# Patient Record
Sex: FEMALE | Race: BLACK OR AFRICAN AMERICAN | Employment: FULL TIME | URBAN - METROPOLITAN AREA
[De-identification: names, ages, dates, MRNs, and addresses within clinical notes are randomized per-mention and may not be internally consistent; named-entity substitution may affect disease eponyms.]

---

## 2017-06-25 ENCOUNTER — APPOINTMENT (OUTPATIENT)
Dept: CT IMAGING | Age: 47
End: 2017-06-25
Attending: NURSE PRACTITIONER
Payer: COMMERCIAL

## 2017-06-25 ENCOUNTER — HOSPITAL ENCOUNTER (EMERGENCY)
Age: 47
Discharge: HOME | End: 2017-06-25
Attending: EMERGENCY MEDICINE | Admitting: EMERGENCY MEDICINE
Payer: COMMERCIAL

## 2017-06-25 VITALS
OXYGEN SATURATION: 100 % | WEIGHT: 137 LBS | RESPIRATION RATE: 16 BRPM | SYSTOLIC BLOOD PRESSURE: 160 MMHG | BODY MASS INDEX: 20.76 KG/M2 | HEIGHT: 68 IN | DIASTOLIC BLOOD PRESSURE: 78 MMHG | TEMPERATURE: 98.3 F | HEART RATE: 74 BPM

## 2017-06-25 DIAGNOSIS — R51.9 NONINTRACTABLE HEADACHE, UNSPECIFIED CHRONICITY PATTERN, UNSPECIFIED HEADACHE TYPE: ICD-10-CM

## 2017-06-25 DIAGNOSIS — Q61.3 POLYCYSTIC KIDNEY DISEASE: ICD-10-CM

## 2017-06-25 DIAGNOSIS — R10.84 ABDOMINAL PAIN, GENERALIZED: Primary | ICD-10-CM

## 2017-06-25 LAB
ALBUMIN SERPL BCP-MCNC: 3.8 G/DL (ref 3.5–5)
ALBUMIN/GLOB SERPL: 1 {RATIO} (ref 1.1–2.2)
ALP SERPL-CCNC: 55 U/L (ref 45–117)
ALT SERPL-CCNC: 23 U/L (ref 12–78)
ANION GAP BLD CALC-SCNC: 8 MMOL/L (ref 5–15)
APPEARANCE UR: CLEAR
AST SERPL W P-5'-P-CCNC: 12 U/L (ref 15–37)
BACTERIA URNS QL MICRO: NEGATIVE /HPF
BASOPHILS # BLD AUTO: 0 K/UL (ref 0–0.1)
BASOPHILS # BLD: 1 % (ref 0–1)
BILIRUB SERPL-MCNC: 0.5 MG/DL (ref 0.2–1)
BILIRUB UR QL: NEGATIVE
BUN SERPL-MCNC: 6 MG/DL (ref 6–20)
BUN/CREAT SERPL: 6 (ref 12–20)
CALCIUM SERPL-MCNC: 8.8 MG/DL (ref 8.5–10.1)
CHLORIDE SERPL-SCNC: 104 MMOL/L (ref 97–108)
CO2 SERPL-SCNC: 27 MMOL/L (ref 21–32)
COLOR UR: ABNORMAL
CREAT SERPL-MCNC: 1 MG/DL (ref 0.55–1.02)
EOSINOPHIL # BLD: 0.1 K/UL (ref 0–0.4)
EOSINOPHIL NFR BLD: 1 % (ref 0–7)
EPITH CASTS URNS QL MICRO: ABNORMAL /LPF
ERYTHROCYTE [DISTWIDTH] IN BLOOD BY AUTOMATED COUNT: 13.7 % (ref 11.5–14.5)
GLOBULIN SER CALC-MCNC: 3.9 G/DL (ref 2–4)
GLUCOSE SERPL-MCNC: 83 MG/DL (ref 65–100)
GLUCOSE UR STRIP.AUTO-MCNC: NEGATIVE MG/DL
HCG UR QL: NEGATIVE
HCT VFR BLD AUTO: 37.2 % (ref 35–47)
HGB BLD-MCNC: 11.9 G/DL (ref 11.5–16)
HGB UR QL STRIP: ABNORMAL
KETONES UR QL STRIP.AUTO: NEGATIVE MG/DL
LEUKOCYTE ESTERASE UR QL STRIP.AUTO: ABNORMAL
LIPASE SERPL-CCNC: 140 U/L (ref 73–393)
LYMPHOCYTES # BLD AUTO: 44 % (ref 12–49)
LYMPHOCYTES # BLD: 2.7 K/UL (ref 0.8–3.5)
MCH RBC QN AUTO: 25.5 PG (ref 26–34)
MCHC RBC AUTO-ENTMCNC: 32 G/DL (ref 30–36.5)
MCV RBC AUTO: 79.7 FL (ref 80–99)
MONOCYTES # BLD: 0.2 K/UL (ref 0–1)
MONOCYTES NFR BLD AUTO: 3 % (ref 5–13)
NEUTS SEG # BLD: 3.2 K/UL (ref 1.8–8)
NEUTS SEG NFR BLD AUTO: 51 % (ref 32–75)
NITRITE UR QL STRIP.AUTO: NEGATIVE
PH UR STRIP: 6 [PH] (ref 5–8)
PLATELET # BLD AUTO: 304 K/UL (ref 150–400)
POTASSIUM SERPL-SCNC: 3.8 MMOL/L (ref 3.5–5.1)
PROT SERPL-MCNC: 7.7 G/DL (ref 6.4–8.2)
PROT UR STRIP-MCNC: NEGATIVE MG/DL
RBC # BLD AUTO: 4.67 M/UL (ref 3.8–5.2)
RBC #/AREA URNS HPF: ABNORMAL /HPF (ref 0–5)
SODIUM SERPL-SCNC: 139 MMOL/L (ref 136–145)
SP GR UR REFRACTOMETRY: <1.005 (ref 1–1.03)
UROBILINOGEN UR QL STRIP.AUTO: 0.2 EU/DL (ref 0.2–1)
WBC # BLD AUTO: 6.2 K/UL (ref 3.6–11)
WBC URNS QL MICRO: ABNORMAL /HPF (ref 0–4)

## 2017-06-25 PROCEDURE — 74177 CT ABD & PELVIS W/CONTRAST: CPT

## 2017-06-25 PROCEDURE — 74011636320 HC RX REV CODE- 636/320: Performed by: EMERGENCY MEDICINE

## 2017-06-25 PROCEDURE — 36415 COLL VENOUS BLD VENIPUNCTURE: CPT | Performed by: NURSE PRACTITIONER

## 2017-06-25 PROCEDURE — 81025 URINE PREGNANCY TEST: CPT

## 2017-06-25 PROCEDURE — 70450 CT HEAD/BRAIN W/O DYE: CPT

## 2017-06-25 PROCEDURE — 85025 COMPLETE CBC W/AUTO DIFF WBC: CPT | Performed by: NURSE PRACTITIONER

## 2017-06-25 PROCEDURE — 74011000258 HC RX REV CODE- 258: Performed by: EMERGENCY MEDICINE

## 2017-06-25 PROCEDURE — 80053 COMPREHEN METABOLIC PANEL: CPT | Performed by: NURSE PRACTITIONER

## 2017-06-25 PROCEDURE — 96360 HYDRATION IV INFUSION INIT: CPT

## 2017-06-25 PROCEDURE — 83690 ASSAY OF LIPASE: CPT | Performed by: NURSE PRACTITIONER

## 2017-06-25 PROCEDURE — 74011250636 HC RX REV CODE- 250/636: Performed by: NURSE PRACTITIONER

## 2017-06-25 PROCEDURE — 99283 EMERGENCY DEPT VISIT LOW MDM: CPT

## 2017-06-25 PROCEDURE — 81001 URINALYSIS AUTO W/SCOPE: CPT | Performed by: NURSE PRACTITIONER

## 2017-06-25 RX ORDER — SODIUM CHLORIDE 9 MG/ML
1000 INJECTION, SOLUTION INTRAVENOUS CONTINUOUS
Status: DISCONTINUED | OUTPATIENT
Start: 2017-06-25 | End: 2017-06-25 | Stop reason: HOSPADM

## 2017-06-25 RX ORDER — SODIUM CHLORIDE 0.9 % (FLUSH) 0.9 %
10 SYRINGE (ML) INJECTION
Status: COMPLETED | OUTPATIENT
Start: 2017-06-25 | End: 2017-06-25

## 2017-06-25 RX ADMIN — Medication 10 ML: at 18:26

## 2017-06-25 RX ADMIN — SODIUM CHLORIDE 100 ML: 900 INJECTION, SOLUTION INTRAVENOUS at 18:26

## 2017-06-25 RX ADMIN — IOPAMIDOL 100 ML: 755 INJECTION, SOLUTION INTRAVENOUS at 18:26

## 2017-06-25 RX ADMIN — SODIUM CHLORIDE 1000 ML: 900 INJECTION, SOLUTION INTRAVENOUS at 17:23

## 2017-06-25 NOTE — ED TRIAGE NOTES
Pt report severe abd pain that is generalized and intermittent. Pt states the pain started this past Friday. Pt states she has been nauseated but denies V/D. Pt also C/O HA since this past Friday.

## 2017-06-25 NOTE — ED PROVIDER NOTES
HPI Comments: 48 y.o. female with past medical history significant for polycystic kidney disease who presents with chief complaint of abdominal pain. Patient complains of intermittent episodes of upper abdominal \"spasms\" for 3 days. Patient states she became nauseated yesterday but denies any currently. Patient states she's had a decreased appetite and is thirsty, though she has been trying to drink water. Patient also complains of an intermittent right sided headache for the same duration, stating it is currently \"dull and pounding. \" Patient states her neck becomes stiff during the headaches. Patient states she's had worse headaches in the past but none that have lasted this long. Patient states she took some Excedrin Migraine with relief but only once. Patient states she was feeling better today, napped, then awoke with the returning pain. Patient states she had a stressful incident on 6/23/17 and denies any complaints prior to that day. Patient states she is currently on her menstrual period which she notes has been abnormally heavy. Patient does not have a nephrologist, GI, or PCP. Patient denies recent falls. Patient denies vomiting, urinary sx, or photophobia. There are no other acute medical concerns at this time. Social hx: never smoker, no alcohol  PCP: None    Note written by Igor Prater, as dictated by Asher Beavers NP 5:13 PM     The history is provided by the patient. No  was used. Past Medical History:   Diagnosis Date    Polycystic kidney disease        History reviewed. No pertinent surgical history. History reviewed. No pertinent family history. Social History     Social History    Marital status:      Spouse name: N/A    Number of children: N/A    Years of education: N/A     Occupational History    Not on file.      Social History Main Topics    Smoking status: Never Smoker    Smokeless tobacco: Never Used    Alcohol use No    Drug use: Not on file    Sexual activity: Not on file     Other Topics Concern    Not on file     Social History Narrative         ALLERGIES: Review of patient's allergies indicates no known allergies. Review of Systems   Constitutional: Positive for appetite change. Negative for chills, fatigue and fever. HENT: Negative for congestion, rhinorrhea and sore throat. Eyes: Negative for photophobia. Respiratory: Negative for cough and shortness of breath. Cardiovascular: Negative for chest pain and leg swelling. Gastrointestinal: Positive for abdominal pain. Negative for constipation, nausea and vomiting. Endocrine: Positive for polydipsia. Genitourinary: Negative for difficulty urinating and dysuria. Musculoskeletal: Positive for neck stiffness. Negative for back pain and neck pain. Skin: Negative for rash and wound. Neurological: Positive for headaches. All other systems reviewed and are negative. Vitals:    06/25/17 1650   BP: 165/83   Pulse: 74   Resp: 16   Temp: 98.3 °F (36.8 °C)   SpO2: 99%   Weight: 62.1 kg (137 lb)   Height: 5' 8\" (1.727 m)            Physical Exam   Constitutional: She is oriented to person, place, and time. She appears well-developed and well-nourished. No distress. HENT:   Head: Normocephalic and atraumatic. Right Ear: External ear normal.   Left Ear: External ear normal.   Nose: Nose normal.   Mouth/Throat: Oropharynx is clear and moist.   Head and face nontender   Eyes: Conjunctivae and EOM are normal. Pupils are equal, round, and reactive to light. Right eye exhibits no discharge. Left eye exhibits no discharge. Neck: Normal range of motion. Neck supple. Cardiovascular: Normal rate, regular rhythm, normal heart sounds and intact distal pulses. Pulmonary/Chest: Effort normal and breath sounds normal.   Abdominal: Soft. Bowel sounds are normal. She exhibits no distension. There is tenderness. There is no rebound and no guarding.    Upper abdominal tenderness, worse in the epigastrium   Musculoskeletal: Normal range of motion. She exhibits no edema or tenderness. Neurological: She is alert and oriented to person, place, and time. No cranial nerve deficit. Coordination normal.   Skin: Skin is warm and dry. No rash noted. Psychiatric: She has a normal mood and affect. Her behavior is normal. Judgment and thought content normal.   Nursing note and vitals reviewed. Note written by Igor Eaton, as dictated by Alina Troncoso NP 5:14 PM      MDM  Number of Diagnoses or Management Options  Abdominal pain, generalized:   Nonintractable headache, unspecified chronicity pattern, unspecified headache type:   Polycystic kidney disease:   Diagnosis management comments: 47 yo F with sudden onset of upper abdominal pain, headache on Friday. States sx were precipitated by stressful event.  + nausea, intermittent upper abdominal pain. Denies vomiting or diarrhea. Abdomen soft with mild tenderness upper abdomen with no rigidity, masses or guarding. No flank or CVA tenderness. Pt has polycystic kidney disease. Does not have PCP or nephrologist since moving to [facility].  + R sided neck pain that radiates up into R side of head. Denies visual sx but states this headache is different than any other headache she has ever had and has lasted longer than any other HA. Only tx since onset has been Excedrin migraine once. Labs, UA, hydration, CT of abdomen/pelvis and head ordered. Pt denies offer of medication for pain or nausea. Results reviewed. Discussed findings with pt who states she feels much improved after hydration. Discussed hx, presentation and findings with Dr. Magdaleno Clark who agrees with plan of care and plan for discharge with FU with PCP and nephrologist, provider information included in discharge instructions. Pt agreeable to plan of care, plan for discharge with return to the ED for worsening sx which were reviewed with pt prior to discharge. Amount and/or Complexity of Data Reviewed  Clinical lab tests: ordered and reviewed  Tests in the radiology section of CPT®: ordered and reviewed  Discuss the patient with other providers: yes (Dr. Stefani Rollins)    Patient Progress  Patient progress: stable    ED Course       Procedures  LABORATORY TESTS:  Recent Results (from the past 12 hour(s))   CBC WITH AUTOMATED DIFF    Collection Time: 06/25/17  5:15 PM   Result Value Ref Range    WBC 6.2 3.6 - 11.0 K/uL    RBC 4.67 3.80 - 5.20 M/uL    HGB 11.9 11.5 - 16.0 g/dL    HCT 37.2 35.0 - 47.0 %    MCV 79.7 (L) 80.0 - 99.0 FL    MCH 25.5 (L) 26.0 - 34.0 PG    MCHC 32.0 30.0 - 36.5 g/dL    RDW 13.7 11.5 - 14.5 %    PLATELET 731 689 - 270 K/uL    NEUTROPHILS 51 32 - 75 %    LYMPHOCYTES 44 12 - 49 %    MONOCYTES 3 (L) 5 - 13 %    EOSINOPHILS 1 0 - 7 %    BASOPHILS 1 0 - 1 %    ABS. NEUTROPHILS 3.2 1.8 - 8.0 K/UL    ABS. LYMPHOCYTES 2.7 0.8 - 3.5 K/UL    ABS. MONOCYTES 0.2 0.0 - 1.0 K/UL    ABS. EOSINOPHILS 0.1 0.0 - 0.4 K/UL    ABS. BASOPHILS 0.0 0.0 - 0.1 K/UL   METABOLIC PANEL, COMPREHENSIVE    Collection Time: 06/25/17  5:15 PM   Result Value Ref Range    Sodium 139 136 - 145 mmol/L    Potassium 3.8 3.5 - 5.1 mmol/L    Chloride 104 97 - 108 mmol/L    CO2 27 21 - 32 mmol/L    Anion gap 8 5 - 15 mmol/L    Glucose 83 65 - 100 mg/dL    BUN 6 6 - 20 MG/DL    Creatinine 1.00 0.55 - 1.02 MG/DL    BUN/Creatinine ratio 6 (L) 12 - 20      GFR est AA >60 >60 ml/min/1.73m2    GFR est non-AA 59 (L) >60 ml/min/1.73m2    Calcium 8.8 8.5 - 10.1 MG/DL    Bilirubin, total 0.5 0.2 - 1.0 MG/DL    ALT (SGPT) 23 12 - 78 U/L    AST (SGOT) 12 (L) 15 - 37 U/L    Alk.  phosphatase 55 45 - 117 U/L    Protein, total 7.7 6.4 - 8.2 g/dL    Albumin 3.8 3.5 - 5.0 g/dL    Globulin 3.9 2.0 - 4.0 g/dL    A-G Ratio 1.0 (L) 1.1 - 2.2     LIPASE    Collection Time: 06/25/17  5:15 PM   Result Value Ref Range    Lipase 140 73 - 393 U/L   URINALYSIS W/MICROSCOPIC    Collection Time: 06/25/17  5:19 PM   Result Value Ref Range    Color YELLOW/STRAW      Appearance CLEAR CLEAR      Specific gravity <1.005 1.003 - 1.030    pH (UA) 6.0 5.0 - 8.0      Protein NEGATIVE  NEG mg/dL    Glucose NEGATIVE  NEG mg/dL    Ketone NEGATIVE  NEG mg/dL    Bilirubin NEGATIVE  NEG      Blood MODERATE (A) NEG      Urobilinogen 0.2 0.2 - 1.0 EU/dL    Nitrites NEGATIVE  NEG      Leukocyte Esterase TRACE (A) NEG      WBC 0-4 0 - 4 /hpf    RBC 0-5 0 - 5 /hpf    Epithelial cells FEW FEW /lpf    Bacteria NEGATIVE  NEG /hpf   HCG URINE, QL. - POC    Collection Time: 06/25/17  5:25 PM   Result Value Ref Range    Pregnancy test,urine (POC) NEGATIVE  NEG         IMAGING RESULTS:  CT ABD PELV W CONT   Final Result      CT HEAD WO CONT   Final Result        Results    CT ABD PELV W CONT (Accession [id number]) (Order [id number])         Allergies        No Known Allergies       Result Information      Status Provider Status        Final result (Exam End: 6/25/2017  6:42 PM) Open        Study Result      INDICATION: upper abdominal pain for 3 days, nausea  history of polycystic  kidney disease.     COMPARISON: None     TECHNIQUE:   Following the uneventful intravenous administration of 100 cc Isovue-370, thin  axial images were obtained through the abdomen and pelvis. Coronal and sagittal  reconstructions were generated. Oral contrast was not administered. CT dose  reduction was achieved through use of a standardized protocol tailored for this  examination and automatic exposure control for dose modulation.     FINDINGS:   LUNG BASES: Clear. LIVER: Multiple cysts are noted throughout the liver which nearly completely  replaced portion of the right lobe. No definite biliary dilatation or enhancing  lesion is seen. Portal vein is patent. Liver is markedly enlarged. GALLBLADDER: Unremarkable. SPLEEN: No mass.     PANCREAS: No mass or ductal dilatation. ADRENALS: Not identified  KIDNEYS: Kidneys are markedly enlarged by multiple cysts which are lucent. No  definite hydronephrosis.     GI: No dilatation or wall thickening.     APPENDIX: Unremarkable. PERITONEUM: No ascites or pneumoperitoneum. RETROPERITONEUM: No lymphadenopathy or aortic aneurysm.        URINARY BLADDER: No mass or calculus. PELVIS: No adenopathy or abnormal mass. Retroflexed uterus demonstrates focal  area of fibroid along the anterior wall. BONES: No destructive bone lesion. ADDITIONAL COMMENTS: N/A     IMPRESSION  IMPRESSION:     1. Marked pattern megaly and nephromegaly secondary to multiple cysts consistent  with polycystic kidney disease. There is no evidence of increased density in the  cysts to suggest hemorrhage or complication. 2. No other evidence of acute abnormality in the abdomen or pelvis.        Results    CT HEAD WO CONT (Accession [id number]) (Order [id number])         Allergies        No Known Allergies       Result Information      Status Provider Status        Final result (Exam End: 6/25/2017  6:42 PM) Open        Study Result      EXAM:  CT HEAD WO CONT     INDICATION:   headache x 3 days     COMPARISON: None.     TECHNIQUE: Unenhanced CT of the head was performed using 5 mm images. Brain and  bone windows were generated. CT dose reduction was achieved through use of a  standardized protocol tailored for this examination and automatic exposure  control for dose modulation.       FINDINGS:  The ventricles and sulci are normal in size, shape and configuration and  midline. There is no significant white matter disease. There is no intracranial  hemorrhage, extra-axial collection, mass, mass effect or midline shift. The  basilar cisterns are open. No acute infarct is identified. The bone windows  demonstrate no abnormalities. Minimal mucoperiosteal thickening is noted in the  right sphenoid sinus.     IMPRESSION  IMPRESSION: No acute abnormality.          MEDICATIONS GIVEN:  Medications   0.9% sodium chloride infusion 1,000 mL (1,000 mL IntraVENous New Bag 6/25/17 4293) iopamidol (ISOVUE-370) 76 % injection 100 mL (100 mL IntraVENous Given 6/25/17 1826)   sodium chloride (NS) flush 10 mL (10 mL IntraVENous Given 6/25/17 1826)   sodium chloride 0.9 % bolus infusion 100 mL (100 mL IntraVENous New Bag 6/25/17 1826)       IMPRESSION:  No diagnosis found. PLAN:  1. Current Discharge Medication List        2. Follow-up Information     None        3.  Return to ED if worse

## 2017-06-25 NOTE — DISCHARGE INSTRUCTIONS
Abdominal Pain: Care Instructions  Your Care Instructions    Abdominal pain has many possible causes. Some aren't serious and get better on their own in a few days. Others need more testing and treatment. If your pain continues or gets worse, you need to be rechecked and may need more tests to find out what is wrong. You may need surgery to correct the problem. Don't ignore new symptoms, such as fever, nausea and vomiting, urination problems, pain that gets worse, and dizziness. These may be signs of a more serious problem. Your doctor may have recommended a follow-up visit in the next 8 to 12 hours. If you are not getting better, you may need more tests or treatment. The doctor has checked you carefully, but problems can develop later. If you notice any problems or new symptoms, get medical treatment right away. Follow-up care is a key part of your treatment and safety. Be sure to make and go to all appointments, and call your doctor if you are having problems. It's also a good idea to know your test results and keep a list of the medicines you take. How can you care for yourself at home? · Rest until you feel better. · To prevent dehydration, drink plenty of fluids, enough so that your urine is light yellow or clear like water. Choose water and other caffeine-free clear liquids until you feel better. If you have kidney, heart, or liver disease and have to limit fluids, talk with your doctor before you increase the amount of fluids you drink. · If your stomach is upset, eat mild foods, such as rice, dry toast or crackers, bananas, and applesauce. Try eating several small meals instead of two or three large ones. · Wait until 48 hours after all symptoms have gone away before you have spicy foods, alcohol, and drinks that contain caffeine. · Do not eat foods that are high in fat. · Avoid anti-inflammatory medicines such as aspirin, ibuprofen (Advil, Motrin), and naproxen (Aleve).  These can cause stomach upset. Talk to your doctor if you take daily aspirin for another health problem. When should you call for help? Call 911 anytime you think you may need emergency care. For example, call if:  · You passed out (lost consciousness). · You pass maroon or very bloody stools. · You vomit blood or what looks like coffee grounds. · You have new, severe belly pain. Call your doctor now or seek immediate medical care if:  · Your pain gets worse, especially if it becomes focused in one area of your belly. · You have a new or higher fever. · Your stools are black and look like tar, or they have streaks of blood. · You have unexpected vaginal bleeding. · You have symptoms of a urinary tract infection. These may include:  ¨ Pain when you urinate. ¨ Urinating more often than usual.  ¨ Blood in your urine. · You are dizzy or lightheaded, or you feel like you may faint. Watch closely for changes in your health, and be sure to contact your doctor if:  · You are not getting better after 1 day (24 hours). Where can you learn more? Go to http://reyes"[employer]"kelly.info/. Enter G356 in the search box to learn more about \"Abdominal Pain: Care Instructions. \"  Current as of: March 20, 2017  Content Version: 11.3  © 9444-1713 [employer]. Care instructions adapted under license by [employer] (which disclaims liability or warranty for this information). If you have questions about a medical condition or this instruction, always ask your healthcare professional. [place] [address] any warranty or liability for your use of this information. Headache: Care Instructions  Your Care Instructions    Headaches have many possible causes. Most headaches aren't a sign of a more serious problem, and they will get better on their own. Home treatment may help you feel better faster. The doctor has checked you carefully, but problems can develop later.  If you notice any problems or new symptoms, get medical treatment right away. Follow-up care is a key part of your treatment and safety. Be sure to make and go to all appointments, and call your doctor if you are having problems. It's also a good idea to know your test results and keep a list of the medicines you take. How can you care for yourself at home? · Do not drive if you have taken a prescription pain medicine. · Rest in a quiet, dark room until your headache is gone. Close your eyes and try to relax or go to sleep. Don't watch TV or read. · Put a cold, moist cloth or cold pack on the painful area for 10 to 20 minutes at a time. Put a thin cloth between the cold pack and your skin. · Use a warm, moist towel or a heating pad set on low to relax tight shoulder and neck muscles. · Have someone gently massage your neck and shoulders. · Take pain medicines exactly as directed. ¨ If the doctor gave you a prescription medicine for pain, take it as prescribed. ¨ If you are not taking a prescription pain medicine, ask your doctor if you can take an over-the-counter medicine. · Be careful not to take pain medicine more often than the instructions allow, because you may get worse or more frequent headaches when the medicine wears off. · Do not ignore new symptoms that occur with a headache, such as a fever, weakness or numbness, vision changes, or confusion. These may be signs of a more serious problem. To prevent headaches  · Keep a headache diary so you can figure out what triggers your headaches. Avoiding triggers may help you prevent headaches. Record when each headache began, how long it lasted, and what the pain was like (throbbing, aching, stabbing, or dull). Write down any other symptoms you had with the headache, such as nausea, flashing lights or dark spots, or sensitivity to bright light or loud noise. Note if the headache occurred near your period.  List anything that might have triggered the headache, such as certain foods (chocolate, cheese, wine) or odors, smoke, bright light, stress, or lack of sleep. · Find healthy ways to deal with stress. Headaches are most common during or right after stressful times. Take time to relax before and after you do something that has caused a headache in the past.  · Try to keep your muscles relaxed by keeping good posture. Check your jaw, face, neck, and shoulder muscles for tension, and try relaxing them. When sitting at a desk, change positions often, and stretch for 30 seconds each hour. · Get plenty of sleep and exercise. · Eat regularly and well. Long periods without food can trigger a headache. · Treat yourself to a massage. Some people find that regular massages are very helpful in relieving tension. · Limit caffeine by not drinking too much coffee, tea, or soda. But don't quit caffeine suddenly, because that can also give you headaches. · Reduce eyestrain from computers by blinking frequently and looking away from the computer screen every so often. Make sure you have proper eyewear and that your monitor is set up properly, about an arm's length away. · Seek help if you have depression or anxiety. Your headaches may be linked to these conditions. Treatment can both prevent headaches and help with symptoms of anxiety or depression. When should you call for help? Call 911 anytime you think you may need emergency care. For example, call if:  · You have signs of a stroke. These may include:  ¨ Sudden numbness, paralysis, or weakness in your face, arm, or leg, especially on only one side of your body. ¨ Sudden vision changes. ¨ Sudden trouble speaking. ¨ Sudden confusion or trouble understanding simple statements. ¨ Sudden problems with walking or balance. ¨ A sudden, severe headache that is different from past headaches. Call your doctor now or seek immediate medical care if:  · You have a new or worse headache. · Your headache gets much worse.   Where can you learn more?  Go to http://reyes-kelly.info/. Enter M271 in the search box to learn more about \"Headache: Care Instructions. \"  Current as of: October 14, 2016  Content Version: 11.3  © 3394-8172 [employer]. Care instructions adapted under license by [employer] [address] (which disclaims liability or warranty for this information). If you have questions about a medical condition or this instruction, always ask your healthcare professional. [address] any warranty or liability for your use of this information. Head or Face Pain: Care Instructions  Your Care Instructions  Common causes of head or face pain are allergies, stress, and injuries. Other causes include tooth problems and sinus infections. Eating certain foods, such as chocolate or cheese, or drinking certain liquids, such as coffee or cola, can cause head pain for some people. If you have mild head pain, you may not need treatment. It is important to watch your symptoms and talk to your doctor if your pain continues or gets worse. Follow-up care is a key part of your treatment and safety. Be sure to make and go to all appointments, and call your doctor if you are having problems. It's also a good idea to know your test results and keep a list of the medicines you take. How can you care for yourself at home? · Take pain medicines exactly as directed. ¨ If the doctor gave you a prescription medicine for pain, take it as prescribed. ¨ If you are not taking a prescription pain medicine, ask your doctor if you can take an over-the-counter pain medicine. · Take it easy for the next few days or longer if you are not feeling well. · Use a warm, moist towel or heating pad set on low to relax tight muscles in your shoulder and neck. Have someone gently massage your neck and shoulders. · Put ice or a cold pack on the area for 10 to 20 minutes at a time.  Put a thin cloth between the ice and your skin.  When should you call for help? Call 911 anytime you think you may need emergency care. For example, call if:  · You have twitching, jerking, or a seizure. · You passed out (lost consciousness). · You have symptoms of a stroke. These may include:  ¨ Sudden numbness, tingling, weakness, or loss of movement in your face, arm, or leg, especially on only one side of your body. ¨ Sudden vision changes. ¨ Sudden trouble speaking. ¨ Sudden confusion or trouble understanding simple statements. ¨ Sudden problems with walking or balance. ¨ A sudden, severe headache that is different from past headaches. · You have jaw pain and pain in your chest, shoulder, neck, or arm. Call your doctor now or seek immediate medical care if:  · You have a fever with a stiff neck or a severe headache. · You have nausea and vomiting, or you cannot keep food or liquids down. Watch closely for changes in your health, and be sure to contact your doctor if:  · Your head or face pain does not get better as expected. Where can you learn more? Go to http://reyes-kelly.info/. Enter P568 in the search box to learn more about \"Head or Face Pain: Care Instructions. \"  Current as of: March 20, 2017  Content Version: 11.3  © 5495-9997 [employer]. Care instructions adapted under license by [employer] (which disclaims liability or warranty for this information). If you have questions about a medical condition or this instruction, always ask your healthcare professional. [place] [address] any warranty or liability for your use of this information. Polycystic Kidney Disease: Care Instructions  Your Care Instructions  Finding out that you have kidney disease can be very upsetting. It may have taken you by surprise, since kidney disease usually does not cause symptoms early on. Your diagnosis has a big effect on your family too. They may be worried and feel helpless.  The more you learn about your disease, the better you will be able to get support when you need it. There are different types of kidney disease. You have a type that causes fluid-filled bubbles (or cysts) to grow inside your kidneys. Nothing that you have done has caused them to form. You may have inherited genes that caused these cysts to grow. If they continue to grow and multiply, you may be more and more uncomfortable, and your kidneys may have problems doing their important work. Your doctor can help you set up a treatment plan that can ease pain and help you stay active. Changes in your diet along with a good exercise program should help you stay healthy. Follow-up care is a key part of your treatment and safety. Be sure to make and go to all appointments, and call your doctor if you are having problems. Its also a good idea to know your test results and keep a list of the medicines you take. How can you care for yourself at home? · Take your medicines exactly as prescribed. Call your doctor if you think you are having a problem with your medicine. You will get more details on the specific medicines your doctor prescribes. · Work with your doctor and dietitian to set up a diet that will be healthy for you. ¨ Your doctor may advise you to eat a low-protein diet, although this is not always recommended. ¨ Eat a low-salt diet to keep your blood pressure at normal levels. ¨ Your doctor may recommend that you drink extra fluids to help your kidneys flush out the wastes. Be sure to drink the amount of fluid your doctor advises. ¨ Avoid caffeine products, including coffee, tea, taylor, and chocolate. Caffeine may increase the fluids in the cysts. · Do not smoke. Smoking raises your risk of many health problems, including kidney damage. If you need help quitting, talk to your doctor about stop-smoking programs and medicines. These can increase your chances of quitting for good.   · Do not take ibuprofen, naproxen, or similar medicines, unless your doctor tells you to. These medicines may make kidney problems worse. · You will probably feel a variety of emotions about having this disease. Talk to your doctor about joining a support group for people with polycystic kidney disease. It can be very helpful to hear how others have dealt with the same problems. · Have your family members tested for polycystic kidney disease. This condition runs in families. The disease can be managed better if it is found early. When should you call for help? Call 911 anytime you think you may need emergency care. For example, call if:  · You passed out (lost consciousness). Call your doctor now or seek immediate medical care if:  · You have swelling in your hands or feet. · You are dizzy or lightheaded, or you feel like you may pass out (lose consciousness). · You have an unusual weight gain. · You have shortness of breath. · You have blood in your urine. · You have frequent headaches. Watch closely for changes in your health, and be sure to contact your doctor if you have any problems. Where can you learn more? Go to http://reyes-kelly.info/. Enter R721 in the search box to learn more about \"Polycystic Kidney Disease: Care Instructions. \"  Current as of: April 3, 2017  Content Version: 11.3  © 8577-2217 [employer]. Care instructions adapted under license by [employer] (which disclaims liability or warranty for this information). If you have questions about a medical condition or this instruction, always ask your healthcare professional. [place] [address] any warranty or liability for your use of this information. We hope that we have addressed all of your medical concerns. The examination and treatment you received in the Emergency Department were for an emergent problem and were not intended as complete care.  It is important that you follow up with your healthcare provider(s) for ongoing care. If your symptoms worsen or do not improve as expected, and you are unable to reach your usual health care provider(s), you should return to the Emergency Department. Today's healthcare is undergoing tremendous change, and patient satisfaction surveys are one of the many tools to assess the quality of medical care. You may receive a survey from the [employer] regarding your experience in the Emergency Department. I hope that your experience has been completely positive, particularly the medical care that I provided. As such, please participate in the survey; anything less than excellent does not meet my expectations or intentions. [address] [facility] and [facility] participate in nationally recognized quality of care measures. If your blood pressure is greater than 120/80, as reported below, we urge that you seek medical care to address the potential of high blood pressure, commonly known as hypertension. Hypertension can be hereditary or can be caused by certain medical conditions, pain, stress, or \"white coat syndrome. \"       Please make an appointment with your health care provider(s) for follow up of your Emergency Department visit. VITALS:   Patient Vitals for the past 8 hrs:   Temp Pulse Resp BP SpO2   06/25/17 1650 98.3 °F (36.8 °C) 74 16 165/83 99 %          Thank you for allowing us to provide you with medical care today. We realize that you have many choices for your emergency care needs. Please choose us in the future for any continued health care needs. Elizabeth Grady  Camila Leiva, [address]: [phone]            Recent Results (from the past 24 hour(s))   CBC WITH AUTOMATED DIFF    Collection Time: 06/25/17  5:15 PM   Result Value Ref Range    WBC 6.2 3.6 - 11.0 K/uL    RBC 4.67 3.80 - 5.20 M/uL    HGB 11.9 11.5 - 16.0 g/dL    HCT 37.2 35.0 - 47.0 %    MCV 79.7 (L) 80.0 - 99.0 FL    MCH 25.5 (L) 26.0 - 34.0 PG    MCHC 32.0 30.0 - 36.5 g/dL    RDW 13.7 11.5 - 14.5 %    PLATELET 200 093 - 028 K/uL    NEUTROPHILS 51 32 - 75 %    LYMPHOCYTES 44 12 - 49 %    MONOCYTES 3 (L) 5 - 13 %    EOSINOPHILS 1 0 - 7 %    BASOPHILS 1 0 - 1 %    ABS. NEUTROPHILS 3.2 1.8 - 8.0 K/UL    ABS. LYMPHOCYTES 2.7 0.8 - 3.5 K/UL    ABS. MONOCYTES 0.2 0.0 - 1.0 K/UL    ABS. EOSINOPHILS 0.1 0.0 - 0.4 K/UL    ABS. BASOPHILS 0.0 0.0 - 0.1 K/UL   METABOLIC PANEL, COMPREHENSIVE    Collection Time: 06/25/17  5:15 PM   Result Value Ref Range    Sodium 139 136 - 145 mmol/L    Potassium 3.8 3.5 - 5.1 mmol/L    Chloride 104 97 - 108 mmol/L    CO2 27 21 - 32 mmol/L    Anion gap 8 5 - 15 mmol/L    Glucose 83 65 - 100 mg/dL    BUN 6 6 - 20 MG/DL    Creatinine 1.00 0.55 - 1.02 MG/DL    BUN/Creatinine ratio 6 (L) 12 - 20      GFR est AA >60 >60 ml/min/1.73m2    GFR est non-AA 59 (L) >60 ml/min/1.73m2    Calcium 8.8 8.5 - 10.1 MG/DL    Bilirubin, total 0.5 0.2 - 1.0 MG/DL    ALT (SGPT) 23 12 - 78 U/L    AST (SGOT) 12 (L) 15 - 37 U/L    Alk.  phosphatase 55 45 - 117 U/L    Protein, total 7.7 6.4 - 8.2 g/dL    Albumin 3.8 3.5 - 5.0 g/dL    Globulin 3.9 2.0 - 4.0 g/dL    A-G Ratio 1.0 (L) 1.1 - 2.2     LIPASE    Collection Time: 06/25/17  5:15 PM   Result Value Ref Range    Lipase 140 73 - 393 U/L   URINALYSIS W/MICROSCOPIC    Collection Time: 06/25/17  5:19 PM   Result Value Ref Range    Color YELLOW/STRAW      Appearance CLEAR CLEAR      Specific gravity <1.005 1.003 - 1.030    pH (UA) 6.0 5.0 - 8.0      Protein NEGATIVE  NEG mg/dL    Glucose NEGATIVE  NEG mg/dL    Ketone NEGATIVE  NEG mg/dL    Bilirubin NEGATIVE  NEG      Blood MODERATE (A) NEG      Urobilinogen 0.2 0.2 - 1.0 EU/dL    Nitrites NEGATIVE  NEG      Leukocyte Esterase TRACE (A) NEG      WBC 0-4 0 - 4 /hpf    RBC 0-5 0 - 5 /hpf    Epithelial cells FEW FEW /lpf    Bacteria NEGATIVE  NEG /hpf   HCG URINE, QL. - POC    Collection Time: 06/25/17  5:25 PM   Result Value Ref Range    Pregnancy test,urine (POC) NEGATIVE  NEG         Ct Head Wo Cont    Result Date: 6/25/2017  EXAM:  CT HEAD WO CONT INDICATION:   headache x 3 days COMPARISON: None. TECHNIQUE: Unenhanced CT of the head was performed using 5 mm images. Brain and bone windows were generated. CT dose reduction was achieved through use of a standardized protocol tailored for this examination and automatic exposure control for dose modulation. FINDINGS: The ventricles and sulci are normal in size, shape and configuration and midline. There is no significant white matter disease. There is no intracranial hemorrhage, extra-axial collection, mass, mass effect or midline shift. The basilar cisterns are open. No acute infarct is identified. The bone windows demonstrate no abnormalities. Minimal mucoperiosteal thickening is noted in the right sphenoid sinus. IMPRESSION: No acute abnormality. Ct Abd Pelv W Cont    Result Date: 6/25/2017  INDICATION: upper abdominal pain for 3 days, nausea  history of polycystic kidney disease. COMPARISON: None TECHNIQUE: Following the uneventful intravenous administration of 100 cc Isovue-370, thin axial images were obtained through the abdomen and pelvis. Coronal and sagittal reconstructions were generated. Oral contrast was not administered. CT dose reduction was achieved through use of a standardized protocol tailored for this examination and automatic exposure control for dose modulation. FINDINGS: LUNG BASES: Clear. LIVER: Multiple cysts are noted throughout the liver which nearly completely replaced portion of the right lobe. No definite biliary dilatation or enhancing lesion is seen. Portal vein is patent. Liver is markedly enlarged. GALLBLADDER: Unremarkable. SPLEEN: No mass. PANCREAS: No mass or ductal dilatation. ADRENALS: Not identified KIDNEYS: Kidneys are markedly enlarged by multiple cysts which are lucent. No definite hydronephrosis.  GI: No dilatation or wall thickening. APPENDIX: Unremarkable. PERITONEUM: No ascites or pneumoperitoneum. RETROPERITONEUM: No lymphadenopathy or aortic aneurysm. URINARY BLADDER: No mass or calculus. PELVIS: No adenopathy or abnormal mass. Retroflexed uterus demonstrates focal area of fibroid along the anterior wall. BONES: No destructive bone lesion. ADDITIONAL COMMENTS: N/A     IMPRESSION: 1. Marked pattern megaly and nephromegaly secondary to multiple cysts consistent with polycystic kidney disease. There is no evidence of increased density in the cysts to suggest hemorrhage or complication. 2. No other evidence of acute abnormality in the abdomen or pelvis.

## 2018-01-12 ENCOUNTER — OFFICE VISIT (OUTPATIENT)
Dept: FAMILY MEDICINE CLINIC | Age: 48
End: 2018-01-12

## 2018-01-12 VITALS
TEMPERATURE: 97.8 F | BODY MASS INDEX: 22.13 KG/M2 | HEART RATE: 72 BPM | HEIGHT: 68 IN | OXYGEN SATURATION: 100 % | RESPIRATION RATE: 18 BRPM | DIASTOLIC BLOOD PRESSURE: 80 MMHG | WEIGHT: 146 LBS | SYSTOLIC BLOOD PRESSURE: 134 MMHG

## 2018-01-12 DIAGNOSIS — Q61.3 POLYCYSTIC KIDNEY DISEASE: ICD-10-CM

## 2018-01-12 DIAGNOSIS — K43.9 VENTRAL HERNIA WITHOUT OBSTRUCTION OR GANGRENE: Primary | ICD-10-CM

## 2018-01-12 NOTE — PATIENT INSTRUCTIONS
Frances Oneil TODAY, please go to:   Release of records- prior PCP     CHECK OUT        Please schedule the following appointments at [address] [place] [address]:  · Complete Physical Exam and lab follow up with Dr. Catherine Rausch any time from now to July 2018  · Lab visit, fasting, the week before our visit.

## 2018-01-12 NOTE — MR AVS SNAPSHOT
Visit Information Date & Time Provider Department Dept. Phone Encounter #  
 1/12/2018  4:20 PM [address] Neda Cardenas MD [facility] [phone] [id number] Upcoming Health Maintenance Date Due DTaP/Tdap/Td series (1 - Tdap) 11/27/1989 PAP AKA CERVICAL CYTOLOGY 11/27/1989 Allergies as of 1/12/2018  Review Complete On: 1/12/2018 By: Rosita Soto. Neda Cardenas MD  
  
 Severity Noted Reaction Type Reactions Sulfa (Sulfonamide Antibiotics) Low 01/12/2018    Rash Stated that she breaks out on a rash Current Immunizations  Never Reviewed No immunizations on file. Not reviewed this visit You Were Diagnosed With   
  
 Codes Comments Ventral hernia without obstruction or gangrene    -  Primary ICD-10-CM: K43.9 ICD-9-CM: 553.20 Polycystic kidney disease     ICD-10-CM: Q61.3 ICD-9-CM: 753.12 Vitals BP Pulse Temp Resp Height(growth percentile) Weight(growth percentile) 134/80 (BP 1 Location: Left arm, BP Patient Position: Sitting) 72 97.8 °F (36.6 °C) (Oral) 18 5' 8\" (1.727 m) 146 lb (66.2 kg) LMP SpO2 BMI Smoking Status 01/08/2018 100% 22.2 kg/m2 Never Smoker Vitals History BMI and BSA Data Body Mass Index Body Surface Area  
 22.2 kg/m 2 1.78 m 2 Preferred Pharmacy Pharmacy Name Phone [address], [facility] [address] Your Updated Medication List  
  
   
This list is accurate as of: 1/12/18  5:16 PM.  Always use your most recent med list.  
  
  
  
  
 b complex vitamins tablet Take 1 Tab by mouth Three (3) times a week. IRON PO Take  by mouth every other day. multivitamin tablet Commonly known as:  ONE A DAY Take 1 Tab by mouth daily. VITAMIN D3 2,000 unit Tab Generic drug:  cholecalciferol (vitamin D3) Take  by mouth every other day. We Performed the Following REFERRAL TO GENERAL SURGERY [REF27 Custom] REFERRAL TO NEPHROLOGY [BNM49 Custom] Referral Information Referral ID Referred By Referred To  
  
 [id number] Froy Mackenzie [facility] internal medicine [address] [facility]   
   Cecy Dubon Visits Status Start Date End Date 1 New Request 1/12/18 1/12/19 If your referral has a status of pending review or denied, additional information will be sent to support the outcome of this decision. Referral ID Referred By Referred To  
 [id number] Lena Perkins MD  
   [address] [place] [address] 159 [address] Kim Dubon[address] [place] [address] Phone: [phone] Fax: [phone] Visits Status Start Date End Date 1 New Request 1/12/18 1/12/19 If your referral has a status of pending review or denied, additional information will be sent to support the outcome of this decision. Patient Instructions Caleb Corado TODAY, please go to: 
 Release of records- prior PCP CHECK OUT Please schedule the following appointments at [address] [place] [address]: 
· Complete Physical Exam and lab follow up with Dr. Willis Licea any time from now to July 2018 · Lab visit, fasting, the week before our visit. Introducing \[facility]\"" & HEALTH SERVICES! [employer] introduces [employer] patient portal. Now you can access parts of your medical record, email your doctor's office, and request medication refills online. 1. In your internet browser, go to [email or web address] 2. Click on the First Time User? Click Here link in the Sign In box. You will see the New Member Sign Up page. 3. Enter your [employer] Access Code exactly as it appears below. You will not need to use this code after youve completed the sign-up process. If you do not sign up before the expiration date, you must request a new code. · [employer] Access Code: [email or web address] Expires: 4/12/2018  5:15 PM 
 
4.  Enter the last four digits of your Social Security Number (xxxx) and Date of Birth (mm/dd/yyyy) as indicated and click Submit. You will be taken to the next sign-up page. 5. Create a [employer] ID. This will be your [employer] login ID and cannot be changed, so think of one that is secure and easy to remember. 6. Create a [employer] password. You can change your password at any time. 7. Enter your Password Reset Question and Answer. This can be used at a later time if you forget your password. 8. Enter your e-mail address. You will receive e-mail notification when new information is available in [address] 9. Click Sign Up. You can now view and download portions of your medical record. 10. Click the Download Summary menu link to download a portable copy of your medical information. If you have questions, please visit the Frequently Asked Questions section of the [employer] website. Remember, [employer] is NOT to be used for urgent needs. For medical emergencies, dial 911. Now available from your iPhone and Android! Please provide this summary of care documentation to your next provider. Your primary care clinician is listed as Kostas Howard. If you have any questions after today's visit, please call [phone].

## 2018-01-12 NOTE — PROGRESS NOTES
Chief Complaint   Patient presents with   [facility] Other     stated that she has polycystic kidney disease and needs a referral to follow up     Umbilical Hernia     stated that she has been experiencing some pain above the belly button, hasn't been diagnosed but suspects because she is able to push it back in and pain to that area.  Ankle swelling     stated this has been going on for a couple of years generally happens after sitting for long periods of time. 1. Have you been to the ER, urgent care clinic since your last visit? Hospitalized since your last visit? Yes, ER for gastrointestinal problems. 2. Have you seen or consulted any other health care providers outside of the [facility] since your last visit? Include any pap smears or colon screening. The patient was counseled on the dangers of tobacco use, and was advised never to start. Reviewed strategies to maximize success, including never to start. Health Maintenance Due   Topic Date Due    DTaP/Tdap/Td series (1 - Tdap) Discussed with patient today and advised to follow up.    11/27/1989    PAP AKA CERVICAL CYTOLOGY Discussed with patient today and advised to follow up.    11/27/1989    Influenza Age 9 to Adult Discussed with patient today and advised to follow up. Patient declines. 08/01/2017     ACP is not on file, advised to return.

## 2018-01-12 NOTE — PROGRESS NOTES
Markos Turnerf [address] [place] [address] Visit   01/12/2018  Patient ID: Marcello Pritchett is a 50 y.o. female. Assessment/Plan:    Diagnoses and all orders for this visit:    1. Ventral hernia without obstruction or gangrene  Gina Smith General Surgery ref [facility]  Recommend surgery referral d/t intermittent pain    2. Polycystic kidney disease  Referred to establish care with specialist  -     REFERRAL TO NEPHROLOGY    Counselled pt on Patient health concerns and plans. Patient was offered a choice/choices in the treatment plan today. Reviewed return precautions as appropriate. Patient expresses understanding of the plan and agrees with recommendations. See patient instructions for more. Patient Instructions   . TODAY, please go to:   Release of records- prior PCP     CHECK OUT        Please schedule the following appointments at [address] [place] [address]:  · Complete Physical Exam and lab follow up with Dr. Madi Vallejo any time from now to July 2018  · Lab visit, fasting, the week before our visit. Subjective:   HPI:  Marcello Pritchett is a 50 y.o. female being seen for:   Chief Complaint   Patient presents with   [facility] Other     stated that she has polycystic kidney disease and needs a referral to follow up     Umbilical Hernia     stated that she has been experiencing some pain above the belly button, hasn't been diagnosed but suspects because she is able to push it back in and pain to that area.  Ankle swelling     stated this has been going on for a couple of years generally happens after sitting for long periods of time. Establish Care  Past medical history, surgical history, social history, family history, medications, allergies reviewed and updated. Prior PCP: was seeing Dr. Gigi Solorzano. · Moved to [facility] from [facility] around Aug 2016    ·  kidney function has been normal. Innumerable cysts, kidneys large in size. · Gets occasional ankle swelling after sitting for a long time.  Better with standing and moving. · When she climbs stairs, breathing good. If she does not exercise, feels more sluggish, stiff. · Now notes a bulge above belly button. Does not want a repair. Sometimes very painful. No color change or warmth, but will get tender. No bowel changes. · Pain new since last few years. · Bulge noticed after pregnancy about 18 years ago  · Has flares of GI problems around the time that symptoms worsen. Off and on since her early 25s maybe. Flares of abdominal pain, dry heaving. Stools may loosen some. No blood. No emesis. Screening and Prevention Due:  Health Maintenance Due   Topic Date Due    DTaP/Tdap/Td series (1 - Tdap) 11/27/1989    PAP AKA CERVICAL CYTOLOGY  11/27/1989         Review of Systems  Otherwise as noted in HPI  ?   Objective:     Visit Vitals    /80 (BP 1 Location: Left arm, BP Patient Position: Sitting)    Pulse 72    Temp 97.8 °F (36.6 °C) (Oral)    Resp 18    Ht 5' 8\" (1.727 m)    Wt 146 lb (66.2 kg)    SpO2 100%    BMI 22.2 kg/m2     Wt Readings from Last 3 Encounters:   01/12/18 146 lb (66.2 kg)   06/25/17 137 lb (62.1 kg)   11/16/16 136 lb 6.4 oz (61.9 kg)     BP Readings from Last 3 Encounters:   01/12/18 134/80   06/25/17 160/78   11/16/16 126/72     PHQ over the last two weeks 1/12/2018   Little interest or pleasure in doing things More than half the days   Feeling down, depressed or hopeless More than half the days   Total Score PHQ 2 4   Trouble falling or staying asleep, or sleeping too much Nearly every day   Feeling tired or having little energy Nearly every day   Poor appetite or overeating Not at all   Feeling bad about yourself - or that you are a failure or have let yourself or your family down More than half the days   Trouble concentrating on things such as school, work, reading or watching TV Nearly every day   Moving or speaking so slowly that other people could have noticed; or the opposite being so fidgety that others notice Not at all   Thoughts of being better off dead, or hurting yourself in some way Not at all   PHQ 9 Score 15   How difficult have these problems made it for you to do your work, take care of your home and get along with others Extremely difficult     Physical Exam   Constitutional: She appears well-developed and well-nourished. No distress. Pulmonary/Chest: Effort normal. No respiratory distress. Abdominal: Soft. She exhibits mass ( superior to umbilicus. unable to palpate abdominal wall defect clearly. nl overlying color). She exhibits no distension. There is no rebound and no guarding. Neurological: She is alert. Psychiatric: She has a normal mood and affect. Her behavior is normal.       Allergies   Allergen Reactions    Sulfa (Sulfonamide Antibiotics) Rash     Stated that she breaks out on a rash      Prior to Admission medications    Medication Sig Start Date End Date Taking? Authorizing Provider   multivitamin (ONE A DAY) tablet Take 1 Tab by mouth daily. Yes Historical Provider   cholecalciferol, vitamin D3, (VITAMIN D3) 2,000 unit tab Take  by mouth every other day. Yes Historical Provider   FERROUS FUMARATE (IRON PO) Take  by mouth every other day. Yes Historical Provider   b complex vitamins tablet Take 1 Tab by mouth Three (3) times a week. Yes Historical Provider     Patient Active Problem List   Diagnosis Code    Fibroid, uterine D25.9    Polycystic kidney disease Q61.3       . Social History     Social History    Marital status:      Spouse name: Savanah Lyon Number of children: 2    Years of education: N/A     Occupational History    [employer]      Social History Main Topics    Smoking status: Never Smoker    Smokeless tobacco: Never Used    Alcohol use No    Drug use: No    Sexual activity: Yes      Comment: monogamous     Other Topics Concern    Not on file     Social History Narrative    Lives with spouse and dtrs/dtr are in college.      [address]       Past Medical History:   Diagnosis Date    Fibroid, uterine     Polycystic kidney disease        Past Surgical History:   Procedure Laterality Date    HX HERNIA REPAIR Left ~    inguinal     HX HERNIA REPAIR  ~56    umbilical     HX OTHER SURGICAL Right as a child/teen    benign tumor removal X2       OB History      Para Term  AB Living    3 1 1  2 2    SAB TAB Ectopic Molar Multiple Live Births    2    2 1        Obstetric Comments    Menarche: 15yo  Flow: regular, monthly   H/o abnl pap: per pt, as of 2018 no  Paps with OBGYN, Dr. Luz Maria Turcios   Has twins          Family History   Problem Relation Age of Onset    Hypertension Mother     Kidney Disease Mother      transplant    Cancer Mother      lung    No Known Problems Father     Heart Disease Maternal Grandmother     Heart Attack Maternal Grandmother     Diabetes Maternal Grandmother     Other Maternal Grandmother      DR or glaucoma or both    Kidney Disease Maternal Grandfather     No Known Problems Paternal Grandmother     No Known Problems Paternal Grandfather     Cancer Other     Diabetes Other     Heart Disease Other     Kidney Disease Other     Glaucoma Other     Cataract Other     Seizures Daughter      childhood    Asthma Daughter      childhood    Attention Deficit Hyperactivity Disorder Daughter     Asthma Daughter      childhood    Other Daughter      umbilical hernia repair

## 2018-02-15 ENCOUNTER — HOSPITAL ENCOUNTER (EMERGENCY)
Age: 48
Discharge: HOME | End: 2018-02-15
Attending: EMERGENCY MEDICINE
Payer: COMMERCIAL

## 2018-02-15 VITALS
OXYGEN SATURATION: 100 % | HEART RATE: 69 BPM | DIASTOLIC BLOOD PRESSURE: 76 MMHG | WEIGHT: 142.8 LBS | TEMPERATURE: 98.4 F | SYSTOLIC BLOOD PRESSURE: 137 MMHG | BODY MASS INDEX: 21.64 KG/M2 | HEIGHT: 68 IN | RESPIRATION RATE: 18 BRPM

## 2018-02-15 DIAGNOSIS — R60.0 BILATERAL LEG EDEMA: Primary | ICD-10-CM

## 2018-02-15 DIAGNOSIS — D50.9 IRON DEFICIENCY ANEMIA, UNSPECIFIED IRON DEFICIENCY ANEMIA TYPE: ICD-10-CM

## 2018-02-15 LAB
ALBUMIN SERPL-MCNC: 3.6 G/DL (ref 3.5–5)
ALBUMIN/GLOB SERPL: 0.9 {RATIO} (ref 1.1–2.2)
ALP SERPL-CCNC: 43 U/L (ref 45–117)
ALT SERPL-CCNC: 27 U/L (ref 12–78)
ANION GAP SERPL CALC-SCNC: 7 MMOL/L (ref 5–15)
AST SERPL-CCNC: 30 U/L (ref 15–37)
ATRIAL RATE: 77 BPM
BASOPHILS # BLD: 0 K/UL (ref 0–0.1)
BASOPHILS NFR BLD: 1 % (ref 0–1)
BILIRUB SERPL-MCNC: 0.6 MG/DL (ref 0.2–1)
BUN SERPL-MCNC: 11 MG/DL (ref 6–20)
BUN/CREAT SERPL: 10 (ref 12–20)
CALCIUM SERPL-MCNC: 8.9 MG/DL (ref 8.5–10.1)
CALCULATED P AXIS, ECG09: 32 DEGREES
CALCULATED R AXIS, ECG10: 59 DEGREES
CALCULATED T AXIS, ECG11: 41 DEGREES
CHLORIDE SERPL-SCNC: 105 MMOL/L (ref 97–108)
CO2 SERPL-SCNC: 26 MMOL/L (ref 21–32)
CREAT SERPL-MCNC: 1.15 MG/DL (ref 0.55–1.02)
CREATININE, EXTERNAL: 1.15
D DIMER PPP FEU-MCNC: 0.54 MG/L FEU (ref 0–0.65)
DIAGNOSIS, 93000: NORMAL
DIFFERENTIAL METHOD BLD: ABNORMAL
EOSINOPHIL # BLD: 0.1 K/UL (ref 0–0.4)
EOSINOPHIL NFR BLD: 3 % (ref 0–7)
ERYTHROCYTE [DISTWIDTH] IN BLOOD BY AUTOMATED COUNT: 14.2 % (ref 11.5–14.5)
GLOBULIN SER CALC-MCNC: 4.1 G/DL (ref 2–4)
GLUCOSE SERPL-MCNC: 79 MG/DL (ref 65–100)
HCT VFR BLD AUTO: 34.4 % (ref 35–47)
HGB BLD-MCNC: 10.8 G/DL (ref 11.5–16)
IMM GRANULOCYTES # BLD: 0 K/UL (ref 0–0.04)
IMM GRANULOCYTES NFR BLD AUTO: 0 % (ref 0–0.5)
LYMPHOCYTES # BLD: 1.6 K/UL (ref 0.8–3.5)
LYMPHOCYTES NFR BLD: 42 % (ref 12–49)
MCH RBC QN AUTO: 25.1 PG (ref 26–34)
MCHC RBC AUTO-ENTMCNC: 31.4 G/DL (ref 30–36.5)
MCV RBC AUTO: 80 FL (ref 80–99)
MONOCYTES # BLD: 0.3 K/UL (ref 0–1)
MONOCYTES NFR BLD: 7 % (ref 5–13)
NEUTS SEG # BLD: 1.8 K/UL (ref 1.8–8)
NEUTS SEG NFR BLD: 47 % (ref 32–75)
NRBC # BLD: 0 K/UL (ref 0–0.01)
NRBC BLD-RTO: 0 PER 100 WBC
P-R INTERVAL, ECG05: 134 MS
PLATELET # BLD AUTO: 255 K/UL (ref 150–400)
PMV BLD AUTO: 9.5 FL (ref 8.9–12.9)
POTASSIUM SERPL-SCNC: 4.8 MMOL/L (ref 3.5–5.1)
PROT SERPL-MCNC: 7.7 G/DL (ref 6.4–8.2)
Q-T INTERVAL, ECG07: 374 MS
QRS DURATION, ECG06: 74 MS
QTC CALCULATION (BEZET), ECG08: 423 MS
RBC # BLD AUTO: 4.3 M/UL (ref 3.8–5.2)
SODIUM SERPL-SCNC: 138 MMOL/L (ref 136–145)
VENTRICULAR RATE, ECG03: 77 BPM
WBC # BLD AUTO: 3.8 K/UL (ref 3.6–11)

## 2018-02-15 PROCEDURE — 80053 COMPREHEN METABOLIC PANEL: CPT | Performed by: EMERGENCY MEDICINE

## 2018-02-15 PROCEDURE — 93005 ELECTROCARDIOGRAM TRACING: CPT

## 2018-02-15 PROCEDURE — 85379 FIBRIN DEGRADATION QUANT: CPT | Performed by: EMERGENCY MEDICINE

## 2018-02-15 PROCEDURE — 85025 COMPLETE CBC W/AUTO DIFF WBC: CPT | Performed by: EMERGENCY MEDICINE

## 2018-02-15 PROCEDURE — 93970 EXTREMITY STUDY: CPT

## 2018-02-15 PROCEDURE — 99284 EMERGENCY DEPT VISIT MOD MDM: CPT

## 2018-02-15 PROCEDURE — 36415 COLL VENOUS BLD VENIPUNCTURE: CPT | Performed by: EMERGENCY MEDICINE

## 2018-02-15 NOTE — ED TRIAGE NOTES
Pt complains of swelling to her eyes, L hand, LLE. States \"It would happen occasionally but yesterday it was really bad\". States swelling was better except for her eyes when she got up this am but once getting up the swelling started again. Admits to \"a little bit of chest pain but not in conjunction with the swelling. I think it was breast pain\". Denies shortness of breath.

## 2018-02-15 NOTE — DISCHARGE INSTRUCTIONS
Iron Deficiency Anemia: Care Instructions  Your Care Instructions    Anemia means that you do not have enough red blood cells. Red blood cells carry oxygen around your body. When you have anemia, it can make you pale, weak, and tired. Many things can cause anemia. The most common cause is loss of blood. This can happen if you have heavy menstrual periods. It can also happen if you have bleeding in your stomach or bowel. You can also get anemia if you don't have enough iron in your diet or if it's hard for your body to absorb iron. In some cases, pregnancy causes anemia. That's because a pregnant woman needs more iron. Your doctor may do more tests to find the cause of your anemia. If a disease or other health problem is causing it, your doctor will treat that problem. It's important to follow up with your doctor to make sure that your iron level returns to normal.  Follow-up care is a key part of your treatment and safety. Be sure to make and go to all appointments, and call your doctor if you are having problems. It's also a good idea to know your test results and keep a list of the medicines you take. How can you care for yourself at home? · If your doctor recommended iron pills, take them as directed. ¨ Try to take the pills on an empty stomach. You can do this about 1 hour before or 2 hours after meals. But you may need to take iron with food to avoid an upset stomach. ¨ Do not take antacids or drink milk or anything with caffeine within 2 hours of when you take your iron. They can keep your body from absorbing the iron well. ¨ Vitamin C helps your body absorb iron. You may want to take iron pills with a glass of orange juice or some other food high in vitamin C.  ¨ Iron pills may cause stomach problems. These include heartburn, nausea, diarrhea, constipation, and cramps. It can help to drink plenty of fluids and include fruits, vegetables, and fiber in your diet.   ¨ It's normal for iron pills to make your stool a greenish or grayish black. But internal bleeding can also cause dark stool. So it's important to tell your doctor about any color changes. ¨ Call your doctor if you think you are having a problem with your iron pills. Even after you start to feel better, it will take several months for your body to build up its supply of iron. ¨ If you miss a pill, don't take a double dose. ¨ Keep iron pills out of the reach of small children. Too much iron can be very dangerous. · Eat foods with a lot of iron. These include red meat, shellfish, poultry, and eggs. They also include beans, raisins, whole-grain bread, and leafy green vegetables. · Steam your vegetables. This is the best way to prepare them if you want to get as much iron as possible. · Be safe with medicines. Do not take nonsteroidal anti-inflammatory pain relievers unless your doctor tells you to. These include aspirin, naproxen (Aleve), and ibuprofen (Advil, Motrin). · Liquid iron can stain your teeth. But you can mix it with water or juice and drink it with a straw. Then it won't get on your teeth. When should you call for help? Call 911 anytime you think you may need emergency care. For example, call if:  ? · You passed out (lost consciousness). ?Call your doctor now or seek immediate medical care if:  ? · You are short of breath. ? · You are dizzy or light-headed, or you feel like you may faint. ? · You have new or worse bleeding. ? Watch closely for changes in your health, and be sure to contact your doctor if:  ? · You feel weaker or more tired than usual.   ? · You do not get better as expected. Where can you learn more? Go to http://reyes-kelly.info/. Enter D851 in the search box to learn more about \"Iron Deficiency Anemia: Care Instructions. \"  Current as of: October 13, 2016  Content Version: 11.4  © 6258-1564 Healthwise, [employer].  Care instructions adapted under license by [employer] (which disclaims liability or warranty for this information). If you have questions about a medical condition or this instruction, always ask your healthcare professional. [address] any warranty or liability for your use of this information. Leg and Ankle Edema: Care Instructions  Your Care Instructions  Swelling in the legs, ankles, and feet is called edema. It is common after you sit or stand for a while. Long plane flights or car rides often cause swelling in the legs and feet. You may also have swelling if you have to stand for long periods of time at your job. Problems with the veins in the legs (varicose veins) and changes in hormones can also cause swelling. Sometimes the swelling in the ankles and feet is caused by a more serious problem, such as heart failure, infection, blood clots, or liver or kidney disease. Follow-up care is a key part of your treatment and safety. Be sure to make and go to all appointments, and call your doctor if you are having problems. It's also a good idea to know your test results and keep a list of the medicines you take. How can you care for yourself at home? · If your doctor gave you medicine, take it as prescribed. Call your doctor if you think you are having a problem with your medicine. · Whenever you are resting, raise your legs up. Try to keep the swollen area higher than the level of your heart. · Take breaks from standing or sitting in one position. ¨ Walk around to increase the blood flow in your lower legs. ¨ Move your feet and ankles often while you stand, or tighten and relax your leg muscles. · Wear support stockings. Put them on in the morning, before swelling gets worse. · Eat a balanced diet. Lose weight if you need to. · Limit the amount of salt (sodium) in your diet. Salt holds fluid in the body and may increase swelling. When should you call for help? Call 911 anytime you think you may need emergency care.  For example, call if:  ? · You have symptoms of a blood clot in your lung (called a pulmonary embolism). These may include:  ¨ Sudden chest pain. ¨ Trouble breathing. ¨ Coughing up blood. ?Call your doctor now or seek immediate medical care if:  ? · You have signs of a blood clot, such as:  ¨ Pain in your calf, back of the knee, thigh, or groin. ¨ Redness and swelling in your leg or groin. ? · You have symptoms of infection, such as:  ¨ Increased pain, swelling, warmth, or redness. ¨ Red streaks or pus. ¨ A fever. ? Watch closely for changes in your health, and be sure to contact your doctor if:  ? · Your swelling is getting worse. ? · You have new or worsening pain in your legs. ? · You do not get better as expected. Where can you learn more? Go to http://reyes-kelly.info/. Enter K468 in the search box to learn more about \"Leg and Ankle Edema: Care Instructions. \"  Current as of: March 20, 2017  Content Version: 11.4  © 9052-1393 [employer]. Care instructions adapted under license by [employer] (which disclaims liability or warranty for this information). If you have questions about a medical condition or this instruction, always ask your healthcare professional. [address] any warranty or liability for your use of this information. We hope that we have addressed all of your medical concerns. The examination and treatment you received in the Emergency Department were for an emergent problem and were not intended as complete care. It is important that you follow up with your healthcare provider(s) for ongoing care. If your symptoms worsen or do not improve as expected, and you are unable to reach your usual health care provider(s), you should return to the Emergency Department.       Today's healthcare is undergoing tremendous change, and patient satisfaction surveys are one of the many tools to assess the quality of medical care.  You may receive a survey from the [employer] regarding your experience in the Emergency Department. I hope that your experience has been completely positive, particularly the medical care that I provided. As such, please participate in the survey; anything less than excellent does not meet my expectations or intentions. [facility] and [facility] participate in nationally recognized quality of care measures. If your blood pressure is greater than 120/80, as reported below, we urge that you seek medical care to address the potential of high blood pressure, commonly known as hypertension. Hypertension can be hereditary or can be caused by certain medical conditions, pain, stress, or \"white coat syndrome. \"       Please make an appointment with your health care provider(s) for follow up of your Emergency Department visit. VITALS:   Patient Vitals for the past 8 hrs:   Temp Pulse Resp BP SpO2   02/15/18 0900 - - - 134/67 100 %   02/15/18 0750 98.4 °F (36.9 °C) 78 16 155/90 100 %          Thank you for allowing us to provide you with medical care today. We realize that you have many choices for your emergency care needs. Please choose us in the future for any continued health care needs. [facility], [facility].   Office: [phone]            Recent Results (from the past 24 hour(s))   EKG, 12 LEAD, INITIAL    Collection Time: 02/15/18  8:02 AM   Result Value Ref Range    Ventricular Rate 77 BPM    Atrial Rate 77 BPM    P-R Interval 134 ms    QRS Duration 74 ms    Q-T Interval 374 ms    QTC Calculation (Bezet) 423 ms    Calculated P Axis 32 degrees    Calculated R Axis 59 degrees    Calculated T Axis 41 degrees    Diagnosis Normal sinus rhythm    CBC WITH AUTOMATED DIFF    Collection Time: 02/15/18  8:12 AM   Result Value Ref Range    WBC 3.8 3.6 - 11.0 K/uL    RBC 4.30 3.80 - 5.20 M/uL    HGB 10.8 (L) 11.5 - 16.0 g/dL    HCT 34.4 (L) 35.0 - 47.0 %    MCV 80.0 80.0 - 99.0 FL    MCH 25.1 (L) 26.0 - 34.0 PG    MCHC 31.4 30.0 - 36.5 g/dL    RDW 14.2 11.5 - 14.5 %    PLATELET 914 533 - 566 K/uL    MPV 9.5 8.9 - 12.9 FL    NRBC 0.0 0  WBC    ABSOLUTE NRBC 0.00 0.00 - 0.01 K/uL    NEUTROPHILS 47 32 - 75 %    LYMPHOCYTES 42 12 - 49 %    MONOCYTES 7 5 - 13 %    EOSINOPHILS 3 0 - 7 %    BASOPHILS 1 0 - 1 %    IMMATURE GRANULOCYTES 0 0.0 - 0.5 %    ABS. NEUTROPHILS 1.8 1.8 - 8.0 K/UL    ABS. LYMPHOCYTES 1.6 0.8 - 3.5 K/UL    ABS. MONOCYTES 0.3 0.0 - 1.0 K/UL    ABS. EOSINOPHILS 0.1 0.0 - 0.4 K/UL    ABS. BASOPHILS 0.0 0.0 - 0.1 K/UL    ABS. IMM. GRANS. 0.0 0.00 - 0.04 K/UL    DF AUTOMATED     METABOLIC PANEL, COMPREHENSIVE    Collection Time: 02/15/18  8:12 AM   Result Value Ref Range    Sodium 138 136 - 145 mmol/L    Potassium 4.8 3.5 - 5.1 mmol/L    Chloride 105 97 - 108 mmol/L    CO2 26 21 - 32 mmol/L    Anion gap 7 5 - 15 mmol/L    Glucose 79 65 - 100 mg/dL    BUN 11 6 - 20 MG/DL    Creatinine 1.15 (H) 0.55 - 1.02 MG/DL    BUN/Creatinine ratio 10 (L) 12 - 20      GFR est AA >60 >60 ml/min/1.73m2    GFR est non-AA 50 (L) >60 ml/min/1.73m2    Calcium 8.9 8.5 - 10.1 MG/DL    Bilirubin, total 0.6 0.2 - 1.0 MG/DL    ALT (SGPT) 27 12 - 78 U/L    AST (SGOT) 30 15 - 37 U/L    Alk. phosphatase 43 (L) 45 - 117 U/L    Protein, total 7.7 6.4 - 8.2 g/dL    Albumin 3.6 3.5 - 5.0 g/dL    Globulin 4.1 (H) 2.0 - 4.0 g/dL    A-G Ratio 0.9 (L) 1.1 - 2.2     D DIMER    Collection Time: 02/15/18  8:12 AM   Result Value Ref Range    D-dimer 0.54 0.00 - 0.65 mg/L FEU       No results found.

## 2018-02-15 NOTE — PROCEDURES
[facility]  *** FINAL REPORT ***    Name: Laly Lawson  MRN: [id number]  : 1970  HIS Order #: [id number]  [address] [facility] Visit #: [id number]  Date: 15 Feb 2018    TYPE OF TEST: Peripheral Venous Testing    REASON FOR TEST  Bilateral leg swelling. Elevated D-dimer. Right Leg:-  Deep venous thrombosis:           No  Superficial venous thrombosis:    No  Deep venous insufficiency:        Not examined  Superficial venous insufficiency: Not examined    Left Leg:-  Deep venous thrombosis:           No  Superficial venous thrombosis:    No  Deep venous insufficiency:        Not examined  Superficial venous insufficiency: Not examined      INTERPRETATION/FINDINGS  PROCEDURE:  Color duplex ultrasound imaging of lower extremity veins. FINDINGS:       Right: The common femoral, deep femoral, femoral, popliteal,  posterior tibial, peroneal, and great saphenous are patent and without   evidence of thrombus;  each is fully compressible and there is no  narrowing of the flow channel on color Doppler imaging. Phasic flow  is observed in the common femoral vein. Left:   The common femoral, deep femoral, femoral, popliteal,  posterior tibial, peroneal, and great saphenous are patent and without   evidence of thrombus;  each is fully compressible and there is no  narrowing of the flow channel on color Doppler imaging. Phasic flow  is observed in the common femoral vein. IMPRESSION:  No evidence of right or left lower extremity vein  thrombosis. ADDITIONAL COMMENTS    I have personally reviewed the data relevant to the interpretation of  this  study.     TECHNOLOGIST: Dk Sanchez RVT  Signed: 02/15/2018 10:09 AM    PHYSICIAN: Claudean Mania., MD  Signed: 2018 06:28 AM

## 2018-02-15 NOTE — ED PROVIDER NOTES
HPI Comments: 50 y.o. female with past medical history significant for polycystic kidney disease and uterine fibroid who presents ambulatory from home accompanied by her  with chief complaint of leg swelling. Pt states she noticed bilateral leg swelling last night after work. Pt admits to occasional leg swelling, but states episode yesterday was worse than usual. Pt states she woke up this morning and legs were normal, but while getting ready for work, she noticed her eyes and hands were swollen. By the time patient was ready for work, she states legs had started to swell again. Of note, pt reports episode of left \"breast pain\" within the last few days, but states that is not abnormal for her. Pt states swelling was evaluated by her PCP, but cause was not identified at that time. Her PCP recommended wearing compression stockings. Pt states she has an appointment with a nephrologist on 2/19/18. Pt states baseline creatinine is 1 or 1.1. Creatinine was last evaluated in 2016. Pt denies medication allergies. She takes a daily vitamin. Pt specifically denies SOB, cough, fever, and calf pain. There are no other acute medical concerns at this time. Social hx: denies tobacco use; denies EtOH use; denies illicit drug use  PCP: Tyrone Guaman. Wendy Cunningham MD    Note written by Igor Barber, as dictated by Stevan Ruiz MD 8:13 AM        The history is provided by the patient. No  was used.         Past Medical History:   Diagnosis Date    Fibroid, uterine     Polycystic kidney disease        Past Surgical History:   Procedure Laterality Date    HX HERNIA REPAIR Left ~1988    inguinal     HX HERNIA REPAIR  ~3894    umbilical     HX OTHER SURGICAL Right as a child/teen    benign tumor removal X2         Family History:   Problem Relation Age of Onset    Hypertension Mother     Kidney Disease Mother      transplant    Cancer Mother      lung    No Known Problems Father     Heart Disease Maternal Grandmother     Heart Attack Maternal Grandmother     Diabetes Maternal Grandmother     Other Maternal Grandmother      DR or glaucoma or both    Kidney Disease Maternal Grandfather     No Known Problems Paternal Grandmother     No Known Problems Paternal Grandfather     Cancer Other     Diabetes Other     Heart Disease Other     Kidney Disease Other     Glaucoma Other     Cataract Other     Seizures Daughter      childhood    Asthma Daughter      childhood    Attention Deficit Hyperactivity Disorder Daughter     Asthma Daughter      childhood    Other Daughter      umbilical hernia repair       Social History     Social History    Marital status:      Spouse name: Axel    Number of children: 2    Years of education: N/A     Occupational History    [employer]      Social History Main Topics    Smoking status: Never Smoker    Smokeless tobacco: Never Used    Alcohol use No    Drug use: No    Sexual activity: Yes      Comment: monogamous     Other Topics Concern    Not on file     Social History Narrative    Lives with spouse and dtrs/dtr are in college. [employer] [place]         ALLERGIES: Sulfa (sulfonamide antibiotics)    Review of Systems   Constitutional: Negative for fever. Respiratory: Negative for cough and shortness of breath. Cardiovascular: Positive for leg swelling. Negative for chest pain. All other systems reviewed and are negative.       Vitals:    02/15/18 0750   BP: 155/90   Pulse: 78   Resp: 16   Temp: 98.4 °F (36.9 °C)   SpO2: 100%   Weight: 64.8 kg (142 lb 12.8 oz)   Height: 5' 8\" (1.727 m)            Physical Exam   Physical Examination: General appearance - alert, well appearing, and in no distress, oriented to person, place, and time and normal appearing weight  Eyes - pupils equal and reactive, extraocular eye movements intact  Neck - supple, no significant adenopathy  Chest - clear to auscultation, no wheezes, rales or rhonchi, symmetric air entry  Heart - normal rate, regular rhythm, normal S1, S2, no murmurs, rubs, clicks or gallops  Abdomen - soft, nontender, nondistended, no masses or organomegaly  Back exam - full range of motion, no tenderness, palpable spasm or pain on motion  Neurological - alert, oriented, normal speech, no focal findings or movement disorder noted  Musculoskeletal - no joint tenderness, deformity or swelling  Extremities - peripheral pulses normal, no pedal edema, no clubbing or cyanosis  Skin - normal coloration and turgor, no rashes, no suspicious skin lesions noted  MDM  Number of Diagnoses or Management Options     Amount and/or Complexity of Data Reviewed  Clinical lab tests: ordered and reviewed  Tests in the radiology section of CPT®: ordered and reviewed  Decide to obtain previous medical records or to obtain history from someone other than the patient: yes  Obtain history from someone other than the patient: yes (spouse)  Review and summarize past medical records: yes  Independent visualization of images, tracings, or specimens: yes    Patient Progress  Patient progress: improved        ED Course       Procedures  EKG interpretation:  Rhythm: normal sinus rhythm; and regular . Rate (approx.): 77;  Axis: normal; SD interval: normal; QRS interval: normal ; ST/T wave: NSST, t wave inversion V2

## 2018-02-23 ENCOUNTER — OFFICE VISIT (OUTPATIENT)
Dept: FAMILY MEDICINE CLINIC | Age: 48
End: 2018-02-23

## 2018-02-23 VITALS
OXYGEN SATURATION: 100 % | RESPIRATION RATE: 18 BRPM | WEIGHT: 142.9 LBS | TEMPERATURE: 98.6 F | DIASTOLIC BLOOD PRESSURE: 85 MMHG | HEART RATE: 76 BPM | SYSTOLIC BLOOD PRESSURE: 136 MMHG | BODY MASS INDEX: 21.66 KG/M2 | HEIGHT: 68 IN

## 2018-02-23 DIAGNOSIS — Z00.00 WELL WOMAN EXAM WITHOUT GYNECOLOGICAL EXAM: Primary | ICD-10-CM

## 2018-02-23 DIAGNOSIS — M79.89 LEG SWELLING: ICD-10-CM

## 2018-02-23 DIAGNOSIS — R03.0 PREHYPERTENSION: ICD-10-CM

## 2018-02-23 NOTE — MR AVS SNAPSHOT
[facility] 
 
 
 [address] [facility] 
[address] [place] [address] 
[phone] Patient: Millicent Camilo MRN: [id number] :1970 Visit Information Date & Time Provider Department Dept. Phone Encounter #  
 2018  1:20 PM Nolan Gutierrez. Elspeth Saint, MD [facility] [phone] [id number] Upcoming Health Maintenance Date Due DTaP/Tdap/Td series (1 - Tdap) 1989 PAP AKA CERVICAL CYTOLOGY 1989 Allergies as of 2018  Review Complete On: 2/15/2018 By: Bela Chicas RN Severity Noted Reaction Type Reactions Sulfa (Sulfonamide Antibiotics) Low 2018    Rash Stated that she breaks out on a rash Current Immunizations  Never Reviewed No immunizations on file. Not reviewed this visit Vitals BP Pulse Temp Resp Height(growth percentile) Weight(growth percentile) 136/85 (BP 1 Location: Left arm, BP Patient Position: Sitting) 76 98.6 °F (37 °C) (Oral) 18 5' 8\" (1.727 m) 142 lb 14.4 oz (64.8 kg) LMP SpO2 BMI OB Status Smoking Status 2018 100% 21.73 kg/m2 Having regular periods Never Smoker BMI and BSA Data Body Mass Index Body Surface Area 21.73 kg/m 2 1.76 m 2 Preferred Pharmacy Pharmacy Name Phone [address], [address] Your Updated Medication List  
  
   
This list is accurate as of 18  2:34 PM.  Always use your most recent med list.  
  
  
  
  
 b complex vitamins tablet Take 1 Tab by mouth Three (3) times a week. IRON PO Take  by mouth every other day. VITAMIN D3 2,000 unit Tab Generic drug:  cholecalciferol (vitamin D3) Take  by mouth every other day. Patient Instructions Fidelina Davies TODAY, please go to: CHECK OUT Please schedule the following appointments at [facility] OUT: 
 · Blood Pressure and lab follow up with Dr. Mariama Martin in June or July 2017 · Lab visit, fasting, the week before our visit. Today's Plan: Your goal Blood pressure is less than 120/80. The top number is called the systolic blood pressure. The bottom number is called the diastolic blood pressure. If you check your blood pressure at home, write it down and bring it to your follow up appointments. If you have mychart you can enter them there for Dr. Mariama Martin to review. The following can help you improve and control your blood pressure. Food Choices · Learn about the DASH diet · Be Mindful of SODIUM. · Sodium can hide in lots of foods. · Let Dr. Mariama Martin know if you would like to see Nutrition to discuss more Physical Activity · Move more. · Try to walk 150 minutes per week. · Try to walk 10,000 steps per day · Consider counting your steps on your smart phone. Alcohol · Decrease or stop drinking alcohol. Sleep Apnea · If you have sleep apnea, make sure you are using your CPAP as advised. Medication · Often people need medication. Be sure to take as advised. .  
 
 
 
 
 
  
Introducing [facility]! [facility] introduces [employer] patient portal. Now you can access parts of your medical record, email your doctor's office, and request medication refills online. 1. In your internet browser, go to [email or web address] 2. Click on the First Time User? Click Here link in the Sign In box. You will see the New Member Sign Up page. 3. Enter your [employer] Access Code exactly as it appears below. You will not need to use this code after youve completed the sign-up process. If you do not sign up before the expiration date, you must request a new code. · [employer] Access Code: [email or web address] Expires: 4/12/2018  5:15 PM 
 
4. Enter the last four digits of your Social Security Number (xxxx) and Date of Birth (mm/dd/yyyy) as indicated and click Submit.  You will be taken to the next sign-up page. 5. Create a CompareMyFare ID. This will be your CompareMyFare login ID and cannot be changed, so think of one that is secure and easy to remember. 6. Create a CompareMy[employer] password. You can change your password at any time. 7. Enter your Password Reset Question and Answer. This can be used at a later time if you forget your password. 8. Enter your e-mail address. You will receive e-mail notification when new information is available in [address]. 9. Click Sign Up. You can now view and download portions of your medical record. 10. Click the Download Summary menu link to download a portable copy of your medical information. If you have questions, please visit the Frequently Asked Questions section of the [employer] website. Remember, CompareMy[employer] is NOT to be used for urgent needs. For medical emergencies, dial 911. Now available from your iPhone and Android! Please provide this summary of care documentation to your next provider. Your primary care clinician is listed as Jeannie Crow. If you have any questions after today's visit, please call [phone].

## 2018-02-23 NOTE — PATIENT INSTRUCTIONS
Jozef Quispe TODAY, please go to:   CHECK OUT         Please schedule the following appointments at [address] [place] [address]:  · Blood Pressure and lab follow up with Dr. Torito Cortez in June or July 2017  · Lab visit, fasting, the week before our visit. Today's Plan: Your goal Blood pressure is less than 120/80. The top number is called the systolic blood pressure. The bottom number is called the diastolic blood pressure. If you check your blood pressure at home, write it down and bring it to your follow up appointments. If you have mychart you can enter them there for Dr. Torito Cortez to review. The following can help you improve and control your blood pressure. Food Choices  · Learn about the DASH diet  · Be Mindful of SODIUM. · Sodium can hide in lots of foods. · Let Dr. Torito Cortez know if you would like to see Nutrition to discuss more  Physical Activity  · Move more. · Try to walk 150 minutes per week. · Try to walk 10,000 steps per day  · Consider counting your steps on your smart phone. Alcohol  · Decrease or stop drinking alcohol. Sleep Apnea  · If you have sleep apnea, make sure you are using your CPAP as advised. Medication  · Often people need medication. Be sure to take as advised. Jozef Quispe

## 2018-02-23 NOTE — PROGRESS NOTES
Caleb Corado [address] [place] [address] Visit   02/23/2018  Patient ID: Enoc Cr is a 50 y.o. female. Assessment/Plan:    Diagnoses and all orders for this visit:    1. Well woman exam without gynecological exam  -     HEMOGLOBIN A1C WITH EAG  -     LIPID PANEL  #Healthcare maintenance/ Preventive:  - screened for alcohol abuse, counseled if heavy use   - screened for depression  - screened for tobacco use, counseled if active  - HM reviewed and updated as noted. - Labs reviewed in detail as noted. - recommended flu vaccine for coming flu season    - discussed diet and exercise  - recommended dental and vision screenings  - reviewed sexual history  - screened for domestic violence     2. Prehypertension  Educated on lifestyle factors. CTM. Discussed effects of sodium    3. Leg swelling  eval with labs as ordered, she has had unexplained rash in past.  -     GALA, DIRECT, W/REFLEX  -     SED RATE (ESR)  -     RA + CCP ABS; Future        Counselled pt on Patient health concerns and plans. Patient was offered a choice/choices in the treatment plan today. Reviewed return precautions as appropriate. Patient expresses understanding of the plan and agrees with recommendations. See patient instructions for more. Patient Instructions   . TODAY, please go to:   CHECK OUT         Please schedule the following appointments at [address] [place] [address]:  · Blood Pressure and lab follow up with Dr. Willis Licea in June or July 2017  · Lab visit, fasting, the week before our visit. Today's Plan: Your goal Blood pressure is less than 120/80. The top number is called the systolic blood pressure. The bottom number is called the diastolic blood pressure. If you check your blood pressure at home, write it down and bring it to your follow up appointments. If you have mychart you can enter them there for Dr. Willis Licea to review. The following can help you improve and control your blood pressure.    Food Choices  · Learn about the DASH diet  · Be Mindful of SODIUM. · Sodium can hide in lots of foods. · Let Dr. Keny Dudley know if you would like to see Nutrition to discuss more  Physical Activity  · Move more. · Try to walk 150 minutes per week. · Try to walk 10,000 steps per day  · Consider counting your steps on your smart phone. Alcohol  · Decrease or stop drinking alcohol. Sleep Apnea  · If you have sleep apnea, make sure you are using your CPAP as advised. Medication  · Often people need medication. Be sure to take as advised. .             Subjective:   HPI:  Souleymane Alberts is a 50 y.o. female being seen for:   Chief Complaint   Patient presents with    Complete Physical       · Saw ED related to leg swelling. Labs showed anemia. ? Of breast vs chest pain. D-dimer was negative. · Swelling since resolved. · Wonders about Lupus  · Has had rashes w/o cause in the past. She previously chalked it up with [address]. ·  saw nephrology. Cr. Was 1.15 per pt report. BP was 178/102  · Had BP checked twice at pharmacy, was 140/high 80s. · Purchased a BP monitor  · Loves sugar and salt  · New walking partner at work. · Walks 20 flights of stairs. Committing to at least a half an hour a day. · Started jumping jacks at desk. · Intervals on treadmill or walks the river. Complete Physical today  Past medical history, surgical history, social history, family history, medications, allergies reviewed and updated. · Nurse history reviewed  · Domestic violence: denies    reports that she has never smoked. She has never used smokeless tobacco.   reports that she does not drink alcohol. · OB Hx/Menstrual Hx/Sexualhx:   reports that she currently engages in sexual activity.   OB History    Para Term  AB Living   3 1 1  2 2   SAB TAB Ectopic Molar Multiple Live Births   2    2 1      # Outcome Date GA Lbr Adrien/2nd Weight Sex Delivery Anes PTL Lv   3A Term            3B Term            2 SAB            1 SAB Obstetric Comments   Menarche: 9yo   Flow: regular, monthly    H/o abnl pap: per pt, as of 1/12/2018 no   Paps with OBGYN, Dr. Ata Contreras    Has twins     Patient's last menstrual period was 02/09/2018. Lab review  · Labs reviewed in detail    Health Maintenance  Health Maintenance Due   Topic Date Due    DTaP/Tdap/Td series (1 - Tdap) 11/27/1989    PAP AKA CERVICAL CYTOLOGY  11/27/1989     · Depression:     PHQ over the last two weeks 1/12/2018   Little interest or pleasure in doing things More than half the days   Feeling down, depressed or hopeless More than half the days   Total Score PHQ 2 4   Trouble falling or staying asleep, or sleeping too much Nearly every day   Feeling tired or having little energy Nearly every day   Poor appetite or overeating Not at all   Feeling bad about yourself - or that you are a failure or have let yourself or your family down More than half the days   Trouble concentrating on things such as school, work, reading or watching TV Nearly every day   Moving or speaking so slowly that other people could have noticed; or the opposite being so fidgety that others notice Not at all   Thoughts of being better off dead, or hurting yourself in some way Not at all   PHQ 9 Score 15   How difficult have these problems made it for you to do your work, take care of your home and get along with others Extremely difficult        Review of Systems  Otherwise as noted in HPI  ?   Objective:     Visit Vitals    /85 (BP 1 Location: Left arm, BP Patient Position: Sitting)    Pulse 76    Temp 98.6 °F (37 °C) (Oral)    Resp 18    Ht 5' 8\" (1.727 m)    Wt 142 lb 14.4 oz (64.8 kg)    SpO2 100%    BMI 21.73 kg/m2     Wt Readings from Last 3 Encounters:   02/23/18 142 lb 14.4 oz (64.8 kg)   02/15/18 142 lb 12.8 oz (64.8 kg)   01/12/18 146 lb (66.2 kg)     BP Readings from Last 3 Encounters:   02/23/18 136/85   02/15/18 137/76   01/12/18 134/80     PHQ over the last two weeks 1/12/2018   Little interest or pleasure in doing things More than half the days   Feeling down, depressed or hopeless More than half the days   Total Score PHQ 2 4   Trouble falling or staying asleep, or sleeping too much Nearly every day   Feeling tired or having little energy Nearly every day   Poor appetite or overeating Not at all   Feeling bad about yourself - or that you are a failure or have let yourself or your family down More than half the days   Trouble concentrating on things such as school, work, reading or watching TV Nearly every day   Moving or speaking so slowly that other people could have noticed; or the opposite being so fidgety that others notice Not at all   Thoughts of being better off dead, or hurting yourself in some way Not at all   PHQ 9 Score 15   How difficult have these problems made it for you to do your work, take care of your home and get along with others Extremely difficult       Physical Exam   Constitutional: She appears well-developed and well-nourished. No distress. HENT:   Right Ear: Tympanic membrane, external ear and ear canal normal.   Left Ear: Tympanic membrane, external ear and ear canal normal.   Mouth/Throat: Oropharynx is clear and moist. No oropharyngeal exudate. Eyes: Conjunctivae are normal. Pupils are equal, round, and reactive to light. Right eye exhibits no discharge. Left eye exhibits no discharge. No scleral icterus. Neck: Neck supple. No thyromegaly present. Cardiovascular: Normal rate, regular rhythm and normal heart sounds. Exam reveals no gallop and no friction rub. No murmur heard. Pulmonary/Chest: Effort normal and breath sounds normal. No respiratory distress. She has no decreased breath sounds. She has no wheezes. She has no rhonchi. She has no rales. Abdominal: Soft. Bowel sounds are normal. She exhibits no distension and no mass. There is no hepatosplenomegaly. There is no tenderness. There is no rigidity, no rebound and no guarding. Musculoskeletal: She exhibits no edema. Lymphadenopathy:     She has no cervical adenopathy. Right: No supraclavicular adenopathy present. Left: No supraclavicular adenopathy present. Neurological: She is alert. She has normal strength and normal reflexes. No sensory deficit. She exhibits normal muscle tone. Skin: She is not diaphoretic. Psychiatric: She has a normal mood and affect. Her behavior is normal.     _____________________   Patient Active Problem List   Diagnosis Code    Fibroid, uterine D25.9    Polycystic kidney disease Q61.3     Allergies   Allergen Reactions    Sulfa (Sulfonamide Antibiotics) Rash     Stated that she breaks out on a rash      Prior to Admission medications    Medication Sig Start Date End Date Taking? Authorizing Provider   cholecalciferol, vitamin D3, (VITAMIN D3) 2,000 unit tab Take  by mouth every other day. Yes Historical Provider   FERROUS FUMARATE (IRON PO) Take  by mouth every other day. Yes Historical Provider   b complex vitamins tablet Take 1 Tab by mouth Three (3) times a week. Yes Historical Provider     Social History     Social History    Marital status:      Spouse name: Burak Marques Number of children: 2    Years of education: N/A     Occupational History    [employer]      Social History Main Topics    Smoking status: Never Smoker    Smokeless tobacco: Never Used    Alcohol use No    Drug use: No    Sexual activity: Yes      Comment: monogamous     Other Topics Concern    Not on file     Social History Narrative    Lives with spouse and dtrs/dtr are in college.      [address]     Past Medical History:   Diagnosis Date    Fibroid, uterine     Polycystic kidney disease      Past Surgical History:   Procedure Laterality Date    HX HERNIA REPAIR Left ~    inguinal     HX HERNIA REPAIR  ~    umbilical     HX OTHER SURGICAL Right as a child/teen    benign tumor removal X2     OB History      Para Term  AB Living    3 1 1  2 2    SAB TAB Ectopic Molar Multiple Live Births    2    2 1        Obstetric Comments    Menarche: 13yo  Flow: regular, monthly   H/o abnl pap: per pt, as of 2018 no  Paps with OBGYN, Dr. Lucio Shen   Has twins        Family History   Problem Relation Age of Onset    Hypertension Mother     Kidney Disease Mother      transplant    Cancer Mother      lung    No Known Problems Father     Heart Disease Maternal Grandmother     Heart Attack Maternal Grandmother     Diabetes Maternal Grandmother     Other Maternal Grandmother      DR or glaucoma or both    Kidney Disease Maternal Grandfather     No Known Problems Paternal Grandmother     No Known Problems Paternal Grandfather     Cancer Other     Diabetes Other     Heart Disease Other     Kidney Disease Other     Glaucoma Other     Cataract Other     Seizures Daughter      childhood    Asthma Daughter      childhood    Attention Deficit Hyperactivity Disorder Daughter     Asthma Daughter      childhood    Other Daughter      umbilical hernia repair

## 2018-02-23 NOTE — PROGRESS NOTES
KAREN Robledo  Identified pt with two pt identifiers(name and ). Chief Complaint   Patient presents with    Complete Physical       1. Have you been to the ER, urgent care clinic since your last visit? Hospitalized since your last visit? YES, [facility] on Monday    2. Have you seen or consulted any other health care providers outside of the [address] [place] [address] since your last visit? Include any pap smears or colon screening. NO    Today's provider has been notified of reason for visit, vitals and flowsheets obtained on patients. Patient received paperwork for advance directive during previous visit but has not completed at this time     Reviewed record In preparation for visit, huddled with provider and have obtained necessary documentation      Health Maintenance Due   Topic    DTaP/Tdap/Td series (1 - Tdap)    PAP AKA CERVICAL CYTOLOGY        Wt Readings from Last 3 Encounters:   18 142 lb 14.4 oz (64.8 kg)   02/15/18 142 lb 12.8 oz (64.8 kg)   18 146 lb (66.2 kg)     Temp Readings from Last 3 Encounters:   18 98.6 °F (37 °C) (Oral)   02/15/18 98.4 °F (36.9 °C)   18 97.8 °F (36.6 °C) (Oral)     BP Readings from Last 3 Encounters:   18 136/85   02/15/18 137/76   18 134/80     Pulse Readings from Last 3 Encounters:   18 76   02/15/18 69   18 72     Vitals:    18 1339   BP: 136/85   Pulse: 76   Resp: 18   Temp: 98.6 °F (37 °C)   TempSrc: Oral   SpO2: 100%   Weight: 142 lb 14.4 oz (64.8 kg)   Height: 5' 8\" (1.727 m)   PainSc:   0 - No pain   LMP: 2018     Health Maintenance  · Diet:   · What do you want to improve about your diet? Wants to eliminate sugar and chips  · What is going well? Eats a vegetarian diet  · Any foods you do not eat at all? Red meat  · Exercise:  · What do you want to improve about your physical activity? Wants to get enjoyment out of her workouts  · What is going well?  She has a walking cintia  · Dental screening: · Brushing twice a day? Yes  · Seeing Dentist every 6 months? No  · Vision screening:   · Glasses or contacts? Glasses  · Seeing eye doctor at least every 2 years? Yes        Learning Assessment:  :     No flowsheet data found. Depression Screening:  :     PHQ over the last two weeks 1/12/2018   Little interest or pleasure in doing things More than half the days   Feeling down, depressed or hopeless More than half the days   Total Score PHQ 2 4   Trouble falling or staying asleep, or sleeping too much Nearly every day   Feeling tired or having little energy Nearly every day   Poor appetite or overeating Not at all   Feeling bad about yourself - or that you are a failure or have let yourself or your family down More than half the days   Trouble concentrating on things such as school, work, reading or watching TV Nearly every day   Moving or speaking so slowly that other people could have noticed; or the opposite being so fidgety that others notice Not at all   Thoughts of being better off dead, or hurting yourself in some way Not at all   PHQ 9 Score 15   How difficult have these problems made it for you to do your work, take care of your home and get along with others Extremely difficult       Fall Risk Assessment:  :     No flowsheet data found. Abuse Screening:  :     No flowsheet data found. ADL Screening:  :     No flowsheet data found. Medication reconciliation up to date and corrected with patient at this time.

## 2018-03-27 ENCOUNTER — TELEPHONE (OUTPATIENT)
Dept: FAMILY MEDICINE CLINIC | Age: 48
End: 2018-03-27

## 2018-03-27 NOTE — TELEPHONE ENCOUNTER
----- Message from Jason Velsaco sent at 3/27/2018  2:53 PM EDT -----  Regarding: Dr Lillian Mar  Pt requesting call back. Should she go ahead and make appt or just set up next follow up? BP bottom number is running between 85-90. Concerned because of kidney disease.   Her number is [phone]

## 2018-03-28 NOTE — TELEPHONE ENCOUNTER
This is okay in the short term. I recommend that she continue to monitor. Ensure proper technique when checking BP: at rest for at least 5 min, feet flat on the floor, relaxed. If it remains elevated or worsens over the next 1-3 weeks, return for OV. Also be cautious of increase sodium, alcohol, and caffeine.

## 2018-03-28 NOTE — TELEPHONE ENCOUNTER
Spoke with patient I.D. X 2 to see what her concerns were. She stated that she has concerns about BP, her most recent readings were 123/85 and 130/90. She is exercising and making healtier eating habits. Patient was then informed that Dr. Steph Morel would be made aware.

## 2018-04-03 ENCOUNTER — APPOINTMENT (OUTPATIENT)
Dept: CT IMAGING | Age: 48
End: 2018-04-03
Attending: STUDENT IN AN ORGANIZED HEALTH CARE EDUCATION/TRAINING PROGRAM
Payer: COMMERCIAL

## 2018-04-03 ENCOUNTER — HOSPITAL ENCOUNTER (OUTPATIENT)
Age: 48
Setting detail: OBSERVATION
Discharge: HOME | End: 2018-04-04
Attending: EMERGENCY MEDICINE | Admitting: FAMILY MEDICINE
Payer: COMMERCIAL

## 2018-04-03 DIAGNOSIS — D32.9 MENINGIOMA (HCC): ICD-10-CM

## 2018-04-03 DIAGNOSIS — R20.0 LEFT FACIAL NUMBNESS: Primary | ICD-10-CM

## 2018-04-03 DIAGNOSIS — G43.109 COMPLICATED MIGRAINE: ICD-10-CM

## 2018-04-03 DIAGNOSIS — R47.1 DYSARTHRIA: ICD-10-CM

## 2018-04-03 DIAGNOSIS — R51.9 NONINTRACTABLE HEADACHE, UNSPECIFIED CHRONICITY PATTERN, UNSPECIFIED HEADACHE TYPE: ICD-10-CM

## 2018-04-03 PROBLEM — G45.9 TIA (TRANSIENT ISCHEMIC ATTACK): Status: ACTIVE | Noted: 2018-04-03

## 2018-04-03 LAB
ALBUMIN SERPL-MCNC: 3.6 G/DL (ref 3.5–5)
ALBUMIN/GLOB SERPL: 1.1 {RATIO} (ref 1.1–2.2)
ALP SERPL-CCNC: 42 U/L (ref 45–117)
ALT SERPL-CCNC: 19 U/L (ref 12–78)
ANION GAP SERPL CALC-SCNC: 7 MMOL/L (ref 5–15)
AST SERPL-CCNC: 18 U/L (ref 15–37)
BASOPHILS # BLD: 0 K/UL (ref 0–0.1)
BASOPHILS NFR BLD: 1 % (ref 0–1)
BILIRUB SERPL-MCNC: 0.6 MG/DL (ref 0.2–1)
BUN SERPL-MCNC: 12 MG/DL (ref 6–20)
BUN/CREAT SERPL: 13 (ref 12–20)
CALCIUM SERPL-MCNC: 8.5 MG/DL (ref 8.5–10.1)
CHLORIDE SERPL-SCNC: 108 MMOL/L (ref 97–108)
CO2 SERPL-SCNC: 25 MMOL/L (ref 21–32)
CREAT SERPL-MCNC: 0.96 MG/DL (ref 0.55–1.02)
DIFFERENTIAL METHOD BLD: ABNORMAL
EOSINOPHIL # BLD: 0.1 K/UL (ref 0–0.4)
EOSINOPHIL NFR BLD: 2 % (ref 0–7)
ERYTHROCYTE [DISTWIDTH] IN BLOOD BY AUTOMATED COUNT: 14 % (ref 11.5–14.5)
GLOBULIN SER CALC-MCNC: 3.2 G/DL (ref 2–4)
GLUCOSE BLD STRIP.AUTO-MCNC: 71 MG/DL (ref 65–100)
GLUCOSE BLD STRIP.AUTO-MCNC: 77 MG/DL (ref 65–100)
GLUCOSE SERPL-MCNC: 74 MG/DL (ref 65–100)
HCT VFR BLD AUTO: 33.5 % (ref 35–47)
HGB BLD-MCNC: 10.5 G/DL (ref 11.5–16)
IMM GRANULOCYTES # BLD: 0 K/UL (ref 0–0.04)
IMM GRANULOCYTES NFR BLD AUTO: 0 % (ref 0–0.5)
INR BLD: <0.9 (ref 0.9–1.2)
INR PPP: 1.1 (ref 0.9–1.1)
LYMPHOCYTES # BLD: 2 K/UL (ref 0.8–3.5)
LYMPHOCYTES NFR BLD: 39 % (ref 12–49)
MCH RBC QN AUTO: 25.2 PG (ref 26–34)
MCHC RBC AUTO-ENTMCNC: 31.3 G/DL (ref 30–36.5)
MCV RBC AUTO: 80.3 FL (ref 80–99)
MONOCYTES # BLD: 0.3 K/UL (ref 0–1)
MONOCYTES NFR BLD: 6 % (ref 5–13)
NEUTS SEG # BLD: 2.6 K/UL (ref 1.8–8)
NEUTS SEG NFR BLD: 52 % (ref 32–75)
NRBC # BLD: 0 K/UL (ref 0–0.01)
NRBC BLD-RTO: 0 PER 100 WBC
PLATELET # BLD AUTO: 344 K/UL (ref 150–400)
PMV BLD AUTO: 10.7 FL (ref 8.9–12.9)
POTASSIUM SERPL-SCNC: 3.5 MMOL/L (ref 3.5–5.1)
PROT SERPL-MCNC: 6.8 G/DL (ref 6.4–8.2)
PROTHROMBIN TIME: 11.3 SEC (ref 9–11.1)
RBC # BLD AUTO: 4.17 M/UL (ref 3.8–5.2)
SERVICE CMNT-IMP: NORMAL
SERVICE CMNT-IMP: NORMAL
SODIUM SERPL-SCNC: 140 MMOL/L (ref 136–145)
TROPONIN I SERPL-MCNC: <0.04 NG/ML
WBC # BLD AUTO: 5 K/UL (ref 3.6–11)

## 2018-04-03 PROCEDURE — 99285 EMERGENCY DEPT VISIT HI MDM: CPT

## 2018-04-03 PROCEDURE — 99218 HC RM OBSERVATION: CPT

## 2018-04-03 PROCEDURE — 85610 PROTHROMBIN TIME: CPT

## 2018-04-03 PROCEDURE — 74011250636 HC RX REV CODE- 250/636: Performed by: FAMILY MEDICINE

## 2018-04-03 PROCEDURE — 36415 COLL VENOUS BLD VENIPUNCTURE: CPT | Performed by: STUDENT IN AN ORGANIZED HEALTH CARE EDUCATION/TRAINING PROGRAM

## 2018-04-03 PROCEDURE — 80053 COMPREHEN METABOLIC PANEL: CPT | Performed by: EMERGENCY MEDICINE

## 2018-04-03 PROCEDURE — 93005 ELECTROCARDIOGRAM TRACING: CPT

## 2018-04-03 PROCEDURE — 85025 COMPLETE CBC W/AUTO DIFF WBC: CPT | Performed by: STUDENT IN AN ORGANIZED HEALTH CARE EDUCATION/TRAINING PROGRAM

## 2018-04-03 PROCEDURE — 85610 PROTHROMBIN TIME: CPT | Performed by: EMERGENCY MEDICINE

## 2018-04-03 PROCEDURE — 70450 CT HEAD/BRAIN W/O DYE: CPT

## 2018-04-03 PROCEDURE — 84484 ASSAY OF TROPONIN QUANT: CPT | Performed by: FAMILY MEDICINE

## 2018-04-03 PROCEDURE — 82962 GLUCOSE BLOOD TEST: CPT

## 2018-04-03 RX ORDER — PRAVASTATIN SODIUM 20 MG/1
40 TABLET ORAL
Status: DISCONTINUED | OUTPATIENT
Start: 2018-04-03 | End: 2018-04-04 | Stop reason: HOSPADM

## 2018-04-03 RX ORDER — SODIUM CHLORIDE 0.9 % (FLUSH) 0.9 %
5-10 SYRINGE (ML) INJECTION AS NEEDED
Status: DISCONTINUED | OUTPATIENT
Start: 2018-04-03 | End: 2018-04-04 | Stop reason: HOSPADM

## 2018-04-03 RX ORDER — GUAIFENESIN 100 MG/5ML
81 LIQUID (ML) ORAL DAILY
Status: DISCONTINUED | OUTPATIENT
Start: 2018-04-04 | End: 2018-04-04

## 2018-04-03 RX ORDER — SODIUM CHLORIDE 9 MG/ML
50 INJECTION, SOLUTION INTRAVENOUS CONTINUOUS
Status: DISCONTINUED | OUTPATIENT
Start: 2018-04-03 | End: 2018-04-04 | Stop reason: HOSPADM

## 2018-04-03 RX ORDER — SUMATRIPTAN 25 MG/1
25 TABLET, FILM COATED ORAL AS NEEDED
Status: DISCONTINUED | OUTPATIENT
Start: 2018-04-03 | End: 2018-04-04 | Stop reason: HOSPADM

## 2018-04-03 RX ORDER — ACETAMINOPHEN 325 MG/1
650 TABLET ORAL
Status: DISCONTINUED | OUTPATIENT
Start: 2018-04-03 | End: 2018-04-04 | Stop reason: HOSPADM

## 2018-04-03 RX ORDER — ACETAMINOPHEN 650 MG/1
650 SUPPOSITORY RECTAL
Status: DISCONTINUED | OUTPATIENT
Start: 2018-04-03 | End: 2018-04-04 | Stop reason: HOSPADM

## 2018-04-03 RX ORDER — OXYCODONE AND ACETAMINOPHEN 5; 325 MG/1; MG/1
1 TABLET ORAL
Status: DISCONTINUED | OUTPATIENT
Start: 2018-04-03 | End: 2018-04-04 | Stop reason: HOSPADM

## 2018-04-03 RX ORDER — SODIUM CHLORIDE 0.9 % (FLUSH) 0.9 %
5-10 SYRINGE (ML) INJECTION EVERY 8 HOURS
Status: DISCONTINUED | OUTPATIENT
Start: 2018-04-03 | End: 2018-04-04 | Stop reason: HOSPADM

## 2018-04-03 RX ORDER — FAMOTIDINE 20 MG/1
20 TABLET, FILM COATED ORAL EVERY 12 HOURS
Status: DISCONTINUED | OUTPATIENT
Start: 2018-04-03 | End: 2018-04-04 | Stop reason: HOSPADM

## 2018-04-03 RX ADMIN — SODIUM CHLORIDE 50 ML/HR: 900 INJECTION, SOLUTION INTRAVENOUS at 20:58

## 2018-04-03 RX ADMIN — Medication 10 ML: at 20:59

## 2018-04-03 NOTE — ED PROVIDER NOTES
Patient is a 50 y.o. female presenting with headaches and numbness. Headache      Numbness   Associated symptoms include headaches. 51y F here with headache and L sided facial numbness and dysarthria. She awoke with a HA today. At work around 8:30-9a she developed a numbness/tingling sensation to the L side of the face. A few minutes later she felt palpitations in her chest, and after this felt like her tongue was thick, especially on the L side. She felt like it impacted her speech although no one told her she was talking differently than normal. These sx's lasted a few hours, and around 3pm she went to urgent care. When she arrived there, the sx's resolved and she is free of sx's at this time. No hx of similar. No recent illnesses. No fever. No neck stiffness. No rash.     Past Medical History:   Diagnosis Date    Anemia     Fibroid, uterine     Polycystic kidney disease        Past Surgical History:   Procedure Laterality Date    HX HERNIA REPAIR Left ~1988    inguinal     HX HERNIA REPAIR  ~5557    umbilical     HX OTHER SURGICAL Right as a child/teen    benign tumor removal X2         Family History:   Problem Relation Age of Onset    Hypertension Mother     Kidney Disease Mother      transplant    Cancer Mother      lung    No Known Problems Father     Heart Disease Maternal Grandmother     Heart Attack Maternal Grandmother     Diabetes Maternal Grandmother     Other Maternal Grandmother      DR or glaucoma or both    Kidney Disease Maternal Grandfather     No Known Problems Paternal Grandmother     No Known Problems Paternal Grandfather     Cancer Other     Diabetes Other     Heart Disease Other     Kidney Disease Other     Glaucoma Other     Cataract Other     Seizures Daughter      childhood    Asthma Daughter      childhood    Attention Deficit Hyperactivity Disorder Daughter     Asthma Daughter      childhood    Other Daughter      umbilical hernia repair       Social History     Social History    Marital status:      Spouse name: Kit Bank Number of children: 2    Years of education: N/A     Occupational History    [employer]      Social History Main Topics    Smoking status: Never Smoker    Smokeless tobacco: Never Used    Alcohol use No    Drug use: No    Sexual activity: Yes      Comment: monogamous     Other Topics Concern    Not on file     Social History Narrative    Lives with spouse and dtrs/dtr are in college. [employer]         ALLERGIES: Sulfa (sulfonamide antibiotics)    Review of Systems   Neurological: Positive for numbness and headaches. Review of Systems   Constitutional: (-) weight loss. HEENT: (-) stiff neck   Eyes: (-) discharge. Respiratory: (-) for cough. Cardiovascular: (-) syncope. Gastrointestinal: (-) blood in stool. Genitourinary: (-) hematuria. Musculoskeletal: (-) myalgias. Neurological: (-) seizure. Skin: (-) petechiae  Lymph/Immunologic: (-) enlarged lymph nodes  All other systems reviewed and are negative. Vitals:    04/03/18 1640   BP: (!) 170/93   Pulse: 72   Resp: 18   Temp: 98 °F (36.7 °C)   SpO2: 100%   Weight: 66 kg (145 lb 6.4 oz)   Height: 5' 8\" (1.727 m)            Physical Exam Nursing note and vitals reviewed. Constitutional: oriented to person, place, and time. appears well-developed and well-nourished. No distress. Head: Normocephalic and atraumatic. Sclera anicteric  Nose: No rhinorrhea  Mouth/Throat: Oropharynx is clear and moist. Pharynx normal  Eyes: Conjunctivae are normal. Pupils are equal, round, and reactive to light. Right eye exhibits no discharge. Left eye exhibits no discharge. Neck: Painless normal range of motion. Neck supple. No LAD. Cardiovascular: Normal rate, regular rhythm, normal heart sounds and intact distal pulses. Exam reveals no gallop and no friction rub. No murmur heard. Pulmonary/Chest:  No respiratory distress. No wheezes. No rales. No rhonchi.  No increased work of breathing. No accessory muscle use. Good air exchange throughout. Abdominal: soft, non-tender, no rebound or guarding. No hepatosplenomegaly. Normal bowel sounds throughout. Back: no tenderness to palpation, no deformities, no CVA tenderness  Extremities/Musculoskeletal: Normal range of motion. no tenderness. No edema. Distal extremities are neurovasc intact. Lymphadenopathy:   No adenopathy. Neurological:  CN II-XII intact, 5/5 strength throughout, nl sensation throughout, nl cerebellar function, nl speech/fluency, nl gait/station, no pronator drift. Normal mental status. Skin: Skin is warm and dry. No rash noted. No pallor. MDM 51y F here with HA and L sided facial numbness with what sounds like some dysarthria. Sx's resolved prior to arrival to the ED. Plan for CT head and d/w neuro. Likely admit for workup of TIA vs complex migraine. ED Course       Procedures    ED EKG interpretation:  Rhythm: sinus bradycardia; and regular . Rate (approx.): 57; Axis: normal; P wave: normal; QRS interval: normal ; ST/T wave: normal;  This EKG was interpreted by Nery Robins MD,ED Provider.          Total critical care time (excluding procedures): 35 min

## 2018-04-03 NOTE — PROGRESS NOTES
TRANSFER - IN REPORT:    Verbal report received from Sherrie(name) on KAREN Blackburn  being received from ED(unit) for routine progression of care      Report consisted of patients Situation, Background, Assessment and   Recommendations(SBAR). Information from the following report(s) ED Summary was reviewed with the receiving nurse. Opportunity for questions and clarification was provided. Asked to have Dr. Satny Gavin complete orders. States she will have him complete them. Assessment completed upon patients arrival to unit and care assumed.

## 2018-04-03 NOTE — PROGRESS NOTES
Admission Medication Reconciliation:    Information obtained from: Patient    Significant PMH/Disease States:   Past Medical History:   Diagnosis Date    Anemia     Fibroid, uterine     Polycystic kidney disease        Chief Complaint for this Admission:  Headache, numbness    Allergies:  Sulfa (sulfonamide antibiotics)    Prior to Admission Medications:   Prior to Admission Medications   Prescriptions Last Dose Informant Patient Reported? Taking? FERROUS FUMARATE (IRON PO) 3/27/2018 at Unknown time  Yes Yes   Sig: Take  by mouth every other day. b complex vitamins tablet 3/27/2018 at Unknown time  Yes Yes   Sig: Take 1 Tab by mouth Three (3) times a week. cholecalciferol, vitamin D3, (VITAMIN D3) 2,000 unit tab 3/27/2018 at Unknown time  Yes Yes   Sig: Take  by mouth every other day. Facility-Administered Medications: None         Comments/Recommendations: Patient is reliable historian, allergies confirmed. No changes. Thank you for allowing me to participate in the care of your patient.     Alondra Talley PharmD, RN #[id number]

## 2018-04-03 NOTE — ED TRIAGE NOTES
Patient comes to the ER c/o  - Left sided tingling to head radiating to L side of neck that began at 0830   - Heart fluttering and L tongue heaviness since noon today    [facility]. ...  Symptoms have subsided   Code S Level 2

## 2018-04-03 NOTE — IP AVS SNAPSHOT
[address] [facility] [address] 
[phone] Patient: Joo Ryan MRN: [id number] :1970 About your hospitalization You were admitted on:  April 3, 2018 You last received care in the:  [facility] 5 OBSERVATION You were discharged on:  2018 Why you were hospitalized Your primary diagnosis was:  Tia (Transient Ischemic Attack) Follow-up Information Follow up With Details Comments Contact Info Kalei Santos MD In 1 week hospital follow up PCP appointment for  @10:20 a.m. If patient is unable to attend, please call the office. [address] [facility] [address] [facility] [address] 
[phone] Leeanna Ruelas MD  Follow up with neurosurgery for the meningoma see on your  [facility] [address] 
[phone] Your Scheduled Appointments 2018 10:20 AM EDT TRANSITIONAL CARE MANAGEMENT with Omi Lu 28 ([facility]) [facility] [address]  
[phone] Discharge Orders None A check nawaf indicates which time of day the medication should be taken. My Medications CONTINUE taking these medications Instructions Each Dose to Equal  
 Morning Noon Evening Bedtime  
 b complex vitamins tablet Your last dose was: Your next dose is: Take 1 Tab by mouth Three (3) times a week. 1 Tab IRON PO Your last dose was: Your next dose is: Take  by mouth every other day. VITAMIN D3 2,000 unit Tab Generic drug:  cholecalciferol (vitamin D3) Your last dose was: Your next dose is: Take  by mouth every other day. Discharge Instructions Discharge Instructions PATIENT ID: Palmer Ann MRN: [id number] YOB: 1970 DATE OF ADMISSION: 4/3/2018  4:45 PM   
DATE OF DISCHARGE: 4/4/2018 PRIMARY CARE PROVIDER: Cindy Pickens MD  
 
ATTENDING PHYSICIAN: Abelino Olvera MD 
DISCHARGING PROVIDER: Maxwell Decker NP To contact this individual call [phone] and ask the  to page. If unavailable ask to be transferred the Adult Hospitalist Department. DISCHARGE DIAGNOSES Complex Migraine, Meningoma CONSULTATIONS: IP CONSULT TO HOSPITALIST 
IP CONSULT TO NEUROLOGY PROCEDURES/SURGERIES: * No surgery found * PENDING TEST RESULTS:  
At the time of discharge the following test results are still pending: *** FOLLOW UP APPOINTMENTS:  
Follow-up Information Follow up With Details Comments Contact Info Cindy Pickens MD In 1 week hospital follow up  [address] [place] [address] 
[phone] ADDITIONAL CARE RECOMMENDATIONS:  
1. Follow up with your pcp within 1 week for hospital follow up and continued management of your high blood pressure. 2. Follow up with neurosurgery (see contact information above) for the meningoma seen on your MRI. DIET: Cardiac ACTIVITY: as tolerated WOUND CARE: none EQUIPMENT needed: none DISCHARGE MEDICATIONS: 
 See Medication Reconciliation Form · It is important that you take the medication exactly as they are prescribed. · Keep your medication in the bottles provided by the pharmacist and keep a list of the medication names, dosages, and times to be taken in your wallet. · Do not take other medications without consulting your doctor. NOTIFY YOUR PHYSICIAN FOR ANY OF THE FOLLOWING:  
Fever over 101 degrees for 24 hours. Chest pain, shortness of breath, fever, chills, nausea, vomiting, diarrhea, change in mentation, falling, weakness, bleeding. Severe pain or pain not relieved by medications. Or, any other signs or symptoms that you may have questions about. DISPOSITION: 
X  Home With: 
 OT  PT  [facility]  RN  
  
 SNF/Inpatient Rehab/LTAC Independent/assisted living Hospice Other: PROBLEM LIST Updated: Yes X Signed:  
Marli Ayers NP 
4/4/2018 
9:39 AM 
 
  
  
  
modulR Announcement We are excited to announce that we are making your provider's discharge notes available to you in modulR. You will see these notes when they are completed and signed by the physician that discharged you from your recent hospital stay. If you have any questions or concerns about any information you see in modulR, please call the Health Information Department where you were seen or reach out to your Primary Care Provider for more information about your plan of care. Introducing [facility] SERVICES! [employer] introduces modulR patient portal. Now you can access parts of your medical record, email your doctor's office, and request medication refills online. 1. In your internet browser, go to [email or web address] 2. Click on the First Time User? Click Here link in the Sign In box. You will see the New Member Sign Up page. 3. Enter your modulR Access Code exactly as it appears below. You will not need to use this code after youve completed the sign-up process. If you do not sign up before the expiration date, you must request a new code. · modulR Access Code: [email or web address] Expires: 4/12/2018  6:15 PM 
 
4. Enter the last four digits of your Social Security Number (xxxx) and Date of Birth (mm/dd/yyyy) as indicated and click Submit. You will be taken to the next sign-up page. 5. Create a modulR ID. This will be your modulR login ID and cannot be changed, so think of one that is secure and easy to remember. 6. Create a modulR password. You can change your password at any time. 7. Enter your Password Reset Question and Answer.  This can be used at a later time if you forget your password. 8. Enter your e-mail address. You will receive e-mail notification when new information is available in [address] 9. Click Sign Up. You can now view and download portions of your medical record. 10. Click the Download Summary menu link to download a portable copy of your medical information. If you have questions, please visit the Frequently Asked Questions section of the [employer] website. Remember, ITC is NOT to be used for urgent needs. For medical emergencies, dial 911. Now available from your iPhone and Android! Introducing Jayden Hansen As a [facility] patient, I wanted to make you aware of our electronic visit tool called Jayden Hansen. [facility] 24/7 allows you to connect within minutes with a medical provider 24 hours a day, seven days a week via a mobile device or tablet or logging into a secure website from your computer. You can access Jayden Hansen from anywhere in the [place]. A virtual visit might be right for you when you have a simple condition and feel like you just dont want to get out of bed, or cant get away from work for an appointment, when your regular [facility] provider is not available (evenings, weekends or holidays), or when youre out of town and need minor care. Electronic visits cost only $49 and if the Chantel [facility] [employer] 24/7 provider determines a prescription is needed to treat your condition, one can be electronically transmitted to a nearby pharmacy*. Please take a moment to enroll today if you have not already done so. The enrollment process is free and takes just a few minutes. To enroll, please download the [employer] 24/7 juice to your tablet or phone, or visit [email or web address] to enroll on your computer.    
And, as an [address] [place] [address] patient with a [employer] account, the results of your visits will be scanned into your electronic medical record and your primary care provider will be able to view the scanned results. We urge you to continue to see your regular [employer] provider for your ongoing medical care. And while your primary care provider may not be the one available when you seek a Jayden [employer] virtual visit, the peace of mind you get from getting a real diagnosis real time can be priceless. For more information on [employer], view our Frequently Asked Questions (FAQs) at [email or web address]. Sincerely, 
 
Kati Elizabeth MD 
Chief Medical Officer [address] *:  certain medications cannot be prescribed via [employer] Unresulted Labs-Please follow up with your PCP about these lab tests Order Current Status EKG, 12 LEAD, INITIAL Preliminary result Providers Seen During Your Hospitalization Provider Specialty Primary office phone Maryam Rosado MD Emergency Medicine [phone] Xuan Hand MD Hospitalist [phone] Octavia Opitz, MD Internal Medicine [phone] Your Primary Care Physician (PCP) Primary Care Physician Office Phone Office Fax Estelle Bae [phone] You are allergic to the following Allergen Reactions Sulfa (Sulfonamide Antibiotics) Rash Stated that she breaks out on a rash Recent Documentation Height Weight BMI OB Status Smoking Status 1.727 m 64.6 kg 21.67 kg/m2 Having regular periods Never Smoker Emergency Contacts Name Discharge Info Relation Home Work Mobile Oscar Lara DISCHARGE CAREGIVER [3] Spouse [3] [phone] Patient Belongings The following personal items are in your possession at time of discharge: 
     Visual Aid: Glasses Please provide this summary of care documentation to your next provider.  
  
  
 
  
Signatures-by signing, you are acknowledging that this After Visit Summary has been reviewed with you and you have received a copy. Patient Signature:  ____________________________________________________________ Date:  ____________________________________________________________  
  
Alireza Galo Provider Signature:  ____________________________________________________________ Date:  ____________________________________________________________

## 2018-04-03 NOTE — ED NOTES

## 2018-04-04 ENCOUNTER — APPOINTMENT (OUTPATIENT)
Dept: MRI IMAGING | Age: 48
End: 2018-04-04
Attending: FAMILY MEDICINE
Payer: COMMERCIAL

## 2018-04-04 VITALS
OXYGEN SATURATION: 99 % | WEIGHT: 142.5 LBS | RESPIRATION RATE: 16 BRPM | HEART RATE: 76 BPM | HEIGHT: 68 IN | TEMPERATURE: 98.7 F | BODY MASS INDEX: 21.6 KG/M2 | DIASTOLIC BLOOD PRESSURE: 84 MMHG | SYSTOLIC BLOOD PRESSURE: 141 MMHG

## 2018-04-04 LAB
CHOLEST SERPL-MCNC: 126 MG/DL
ERYTHROCYTE [DISTWIDTH] IN BLOOD BY AUTOMATED COUNT: 13.8 % (ref 11.5–14.5)
EST. AVERAGE GLUCOSE BLD GHB EST-MCNC: 103 MG/DL
HBA1C MFR BLD: 5.2 % (ref 4.2–6.3)
HCT VFR BLD AUTO: 30.1 % (ref 35–47)
HDLC SERPL-MCNC: 52 MG/DL
HDLC SERPL: 2.4 {RATIO} (ref 0–5)
HGB BLD-MCNC: 9.5 G/DL (ref 11.5–16)
LDLC SERPL CALC-MCNC: 61.2 MG/DL (ref 0–100)
LIPID PROFILE,FLP: NORMAL
MCH RBC QN AUTO: 25.3 PG (ref 26–34)
MCHC RBC AUTO-ENTMCNC: 31.6 G/DL (ref 30–36.5)
MCV RBC AUTO: 80.3 FL (ref 80–99)
NRBC # BLD: 0 K/UL (ref 0–0.01)
NRBC BLD-RTO: 0 PER 100 WBC
PLATELET # BLD AUTO: 244 K/UL (ref 150–400)
PMV BLD AUTO: 9.4 FL (ref 8.9–12.9)
RBC # BLD AUTO: 3.75 M/UL (ref 3.8–5.2)
TRIGL SERPL-MCNC: 64 MG/DL (ref ?–150)
TROPONIN I SERPL-MCNC: <0.04 NG/ML
VLDLC SERPL CALC-MCNC: 12.8 MG/DL
WBC # BLD AUTO: 4.2 K/UL (ref 3.6–11)

## 2018-04-04 PROCEDURE — 99218 HC RM OBSERVATION: CPT

## 2018-04-04 PROCEDURE — 97161 PT EVAL LOW COMPLEX 20 MIN: CPT

## 2018-04-04 PROCEDURE — 97116 GAIT TRAINING THERAPY: CPT

## 2018-04-04 PROCEDURE — 83036 HEMOGLOBIN GLYCOSYLATED A1C: CPT | Performed by: FAMILY MEDICINE

## 2018-04-04 PROCEDURE — 80061 LIPID PANEL: CPT | Performed by: FAMILY MEDICINE

## 2018-04-04 PROCEDURE — 36415 COLL VENOUS BLD VENIPUNCTURE: CPT | Performed by: FAMILY MEDICINE

## 2018-04-04 PROCEDURE — 85027 COMPLETE CBC AUTOMATED: CPT | Performed by: FAMILY MEDICINE

## 2018-04-04 PROCEDURE — 70551 MRI BRAIN STEM W/O DYE: CPT

## 2018-04-04 PROCEDURE — 84484 ASSAY OF TROPONIN QUANT: CPT | Performed by: FAMILY MEDICINE

## 2018-04-04 PROCEDURE — 93306 TTE W/DOPPLER COMPLETE: CPT

## 2018-04-04 RX ORDER — SODIUM CHLORIDE 0.9 % (FLUSH) 0.9 %
30 SYRINGE (ML) INJECTION
Status: COMPLETED | OUTPATIENT
Start: 2018-04-04 | End: 2018-04-04

## 2018-04-04 RX ADMIN — Medication 30 ML: at 09:54

## 2018-04-04 NOTE — CONSULTS
Consult dictated. Agree that she had a complicated migraine. Incidentally found R parietal convexity meningioma. OP follow up with neurosurgery.   Felicia Page MD

## 2018-04-04 NOTE — DISCHARGE INSTRUCTIONS
Discharge Instructions       PATIENT ID: Nicolle Dugan  MRN: [id number]   YOB: 1970    DATE OF ADMISSION: 4/3/2018  4:45 PM    DATE OF DISCHARGE: 4/4/2018    PRIMARY CARE PROVIDER: Chrystal Curtis. Annie Henning MD     ATTENDING PHYSICIAN: Castro Roland MD  DISCHARGING PROVIDER: Phillip Graf NP    To contact this individual call [phone] and ask the  to page. If unavailable ask to be transferred the Adult Hospitalist Department. DISCHARGE DIAGNOSES Complex Migraine, Meningoma     CONSULTATIONS: IP CONSULT TO HOSPITALIST  IP CONSULT TO NEUROLOGY    PROCEDURES/SURGERIES: * No surgery found *    PENDING TEST RESULTS:   At the time of discharge the following test results are still pending: ***    FOLLOW UP APPOINTMENTS:   Follow-up Information     Follow up With Details Comments [address] RUDY Henning MD In 1 week hospital follow up  [address]  [address] [facility] [address]  [phone]           ADDITIONAL CARE RECOMMENDATIONS:   1. Follow up with your pcp within 1 week for hospital follow up and continued management of your high blood pressure. 2. Follow up with neurosurgery (see contact information above) for the meningoma seen on your MRI. DIET: Cardiac    ACTIVITY: as tolerated    WOUND CARE: none    EQUIPMENT needed: none    DISCHARGE MEDICATIONS:   See Medication Reconciliation Form    · It is important that you take the medication exactly as they are prescribed. · Keep your medication in the bottles provided by the pharmacist and keep a list of the medication names, dosages, and times to be taken in your wallet. · Do not take other medications without consulting your doctor. NOTIFY YOUR PHYSICIAN FOR ANY OF THE FOLLOWING:   Fever over 101 degrees for 24 hours. Chest pain, shortness of breath, fever, chills, nausea, vomiting, diarrhea, change in mentation, falling, weakness, bleeding. Severe pain or pain not relieved by medications.   Or, any other signs or symptoms that you may have questions about. DISPOSITION:  X  Home With:   OT  PT  HH  RN       SNF/Inpatient Rehab/LTAC    Independent/assisted living    Hospice    Other:   PROBLEM LIST Updated:   Yes X       Signed:   Carlita Coffey NP  4/4/2018  9:39 AM

## 2018-04-04 NOTE — PROGRESS NOTES
Occupational Therapy  Initial order received and chart reviewed, noted at this time OT consult cancelled.    Luiz Sender, OTR/L

## 2018-04-04 NOTE — H&P
[address]  ACUTE CARE HISTORY AND PHYSICAL    Kathleen Newell  MR#: [id number]  : 1970  ACCOUNT #: [de-identified]   DATE OF SERVICE: 2018    CHIEF COMPLAINT:  Left headache and left face numbness. HISTORY OF PRESENT ILLNESS:  The patient is a 49-year-old female with past medical history of hypertension, which is diet controlled, presents to the hospital complaining of the above mentioned symptoms. The patient reports she woke up this morning and had a headache which was on the left side. She also started developing some numbness and tingling on that side. The patient went to work, at around 8:30 or 9 started experiencing increased numbness and tingling on the left side of her face and she noticed some \"fluttering in her chest\" and then started noticing that her tongue was \"thick and getting in the way\". Patient reports that she felt that it impacted her speech, but reports that people who were around her did not feel that her speech was any different. The patient reports the symptoms lasted for a few hours. The patient reports that currently she still has a headache, describes it at around 4/10. Patient reports that she went to Urgent Care at around 3 p.m. and was told to come to the hospital for further management and evaluation. The patient denies any other complaints or problems. Patient does report that she has a history of migraines, but those usually do not \"come in this way. \"  Patient denies any blurry vision, sore throat, trouble swallowing, trouble with speech, chest pain, shortness of breath, cough, fever, chills, abdominal pain, constipation, diarrhea, urinary symptoms, focal or generalized neurological weakness, recent travel, sick contacts, falls, injuries or any other concerns or problems. PAST MEDICAL HISTORY:  See above. HOME MEDICATIONS:  Currently the patient is on cholecalciferol, ferrous fumarate, B complex.     SOCIAL HISTORY:  Denies tobacco use. Occasional alcohol. Denies IV drug abuse. ALLERGIES:  SULFA ANTIBIOTICS. FAMILY HISTORY:  Mother had history of hypertension, kidney transplant, lung cancer. Maternal grandmother had history of heart disease, heart attack, diabetes and glaucoma. Maternal grandfather had history of kidney disease. REVIEW OF SYSTEMS:  All systems were reviewed and were found to be essentially negative except for the symptoms mentioned above. PHYSICAL EXAMINATION:    VITAL SIGNS:  Temperature 98, pulse 85, respiratory rate 17. On arrival, the patient's blood pressure was 170/93, currently it is 140/60. Pulse ox 100% on room air. GENERAL:  Alert x3, awake, no acute distress, resting in bed, pleasant female, appears to be stated age. HEENT:  Pupils equal and reactive to light. Dry mucous membranes. Tympanic membranes clear. NECK:  Supple. CHEST:  Clear to auscultation bilaterally. HEART:  S1, S2 were heard. ABDOMEN:  Soft, nontender, nondistended. Bowel sounds are physiologic. EXTREMITIES:  No clubbing, no cyanosis, no edema. NEUROPSYCHIATRIC:  Pleasant mood and affect. Cranial nerves II-XII grossly intact. Sensory grossly within normal limits. DTR 2+/4. Strength 5/5. SKIN:  Warm. LABORATORY DATA:  White count 5.8, hemoglobin 10.5, hematocrit 33.5, platelets 994. Sodium 140, potassium 3.5, chloride 108, bicarbonate 25, anion gap 7, glucose , BUN 12, creatinine 0.96, calcium 8.5, bilirubin total 0.6, ALT 19, AST, alkaline phosphatase 42. INR 1.1. CT of the head shows no acute findings. EKG shows sinus bradycardia. ASSESSMENT AND PLAN:  1. Transient ischemic attack versus complex migraine. Patient's story is highly suspicious for complex migraine, rule out cerebrovascular accident. We will admit the patient on a telemetry bed. Start patient on gentle IV hydration, pain control.   We will get an MRI of the brain, neurology consult, neurovascular checks, aspirin, statin and close monitoring. The patient came in with hypertensive urgency. Currently, blood pressure is better and that could be contributing to the symptoms. CT of the head is negative. We will get troponins and continue to closer monitor. Hemoglobin A1c has been ordered. Reassess as needed. 2.  Hypertensive urgency, resolving. We will monitor blood pressure through the night and may consider starting the patient on oral antihypertensives if his blood pressure continues to be suboptimal.  Will continue to closely monitor. 3.  Migraine headache. We will start patient on Imitrex along with p.o. pain medication and IV hydration. We will provide neurovascular checks and close monitoring, supportive care and reassess as needed. 4.  GI and DVT prophylaxis. The patient will be on SCDs.       Mary Collins MD MM / MN  D: 04/03/2018 19:36     T: 04/03/2018 20:48  JOB #: [id number]

## 2018-04-04 NOTE — CONSULTS
[address] [facility]  MR#: [id number]  : 1970  ACCOUNT #: [de-identified]   DATE OF SERVICE: 2018    REQUESTING PHYSICIAN:  Judith Jack MD    REASON FOR EVALUATION:  Headache and left sided tingling. HISTORY:  The patient is a 38-year-old female with history of hypertension, diet controlled and migraine headaches. She does not get them very frequently and it depends on stress levels in her life. She reports a lot of stress going on at work place recently. She woke up yesterday with a mild headache. While she was at work, she started to experience intensification of the headache and at the same time, she noticed tingling sensation on the left side of her face, going down her neck and shoulder region. She continued to work through it and her coworkers could tell that something was not right with her. She eventually decided to go an urgent care from which she was transferred over to the emergency department. Symptoms started to subside after coming here, but mild headache persisted. It was there this morning, but now it almost gone. Did not have any focal motor or sensory deficits in the extremities. Has never had any neurological symptoms with headaches before. She does get some visual aura/blind spots prior to a migraine and then usually gets one-sided, sharp throbbing pain associated with light sensitivity. No nausea or vomiting. No chest pain, shortness of breath or palpitations. PAST MEDICAL HISTORY: As mentioned above. HOME MEDICATIONS:  Multivitamins. ALLERGIES:  SULFA ANTIBIOTICS. SOCIAL HISTORY: No history of smoking or alcohol use. FAMILY HISTORY: Mother with history of hypertension, kidney transplant and lung cancer. Maternal grandmother with coronary artery disease, diabetes. Maternal grandfather with kidney disease. REVIEW OF SYSTEMS:  As mentioned above.       PHYSICAL EXAMINATION   GENERAL: The patient is alert, oriented. VITAL SIGNS:  Blood pressure 141/84, temperature 98.7, pulses 76. Speech is clear. Comprehension is normal.  Pupils are equal, round and reactive. Extraocular movements are full. Face is symmetric. Tongue is midline. Hearing is baseline. MUSCULOSKELETAL:  Muscle tone and bulk is normal.  Strength normal in both upper and lower extremities. DTRs 2/2 and symmetric. Toes downgoing. Sensation intact. Gait normal.    HEART:  Regular rate and rhythm. CHEST:  Clear. ABDOMEN:  Soft, nontender. Positive bowel sounds. EXTREMITIES:  No edema. LABORATORY DATA:  CBC is unremarkable. Hemoglobin 9.5, hematocrit 30.1, chemistry is unremarkable. Lipid profile:  Triglycerides 64, HDL 52, LDL 61.2. MRI scan of the brain was normal. There was a well circumscribed 11 mm extraaxial lesion in the left parietal convexity. Echocardiogram results are pending. ASSESSMENT AND PLAN:  A 12-year-old female with no vascular risk factors, who is admitted with symptoms of headaches associated with left sided tingling on the face and neck. This was most likely a complicated migraine. Treatment strategies for migraines were reviewed and she should take an analgesic at the onset of aura or headache. Fortunately, her migraines are not very frequent and prophylactic not indicated at this time. Incidentally, we found an 11 mm right parietal convexity meningioma with some mass effect on the brain. I do not believe this was the reason for her current presentation. I do recommend outpatient followup with no surgeries so that it could be followed. Please free to contact me with any further questions. Thank you for this consultation.       MD MARTIN Lazo / MEE  D: 04/04/2018 10:59     T: 04/04/2018 11:27  JOB #: [id number]

## 2018-04-04 NOTE — PROGRESS NOTES
Hospital PCP YADIRA MARIANO appointment scheduled with Nathen Sanchez on Friday April 6, 2018 @ 10:20 a.m.  Added to AVS. Sebastian Flores, CM Specialist

## 2018-04-04 NOTE — DISCHARGE SUMMARY
Discharge Summary       PATIENT ID: Zada Felty  MRN: [id number]   YOB: 1970    DATE OF ADMISSION: 4/3/2018  4:45 PM    DATE OF DISCHARGE: 4/4/2018   PRIMARY CARE PROVIDER: Velma Garcia. Geovanna Lee MD     ATTENDING PHYSICIAN: Anibal Chapman  DISCHARGING PROVIDER: Martinez Garnica NP    To contact this individual call [phone] and ask the  to page. If unavailable ask to be transferred the Adult Hospitalist Department. CONSULTATIONS: IP CONSULT TO HOSPITALIST  IP CONSULT TO NEUROLOGY    PROCEDURES/SURGERIES: * No surgery found *    ADMITTING DIAGNOSES & HOSPITAL COURSE:   Dx: Complex Migraine, HTN    49-year-old female with past medical history of hypertension, which is diet controlled, presents to the hospital complaining of the above mentioned symptoms. The patient reports she woke up this morning and had a headache which was on the left side. She also started developing some numbness and tingling on that side. The patient went to work, at around 8:30 or 9 started experiencing increased numbness and tingling on the left side of her face and she noticed some \"fluttering in her chest\" and then started noticing that her tongue was \"thick and getting in the way\". Patient reports that she felt that it impacted her speech, but reports that people who were around her did not feel that her speech was any different. The patient reports the symptoms lasted for a few hours. The patient reports that currently she still has a headache, describes it at around 4/10. Patient reports that she went to Urgent Care at around 3 p.m. and was told to come to the hospital for further management and evaluation. The patient denies any other complaints or problems. Patient does report that she has a history of migraines, but those usually do not \"come in this way. \"  Patient denies any blurry vision, sore throat, trouble swallowing, trouble with speech, chest pain, shortness of breath, cough, fever, chills, abdominal pain, constipation, diarrhea, urinary symptoms, focal or generalized neurological weakness, recent travel, sick contacts, falls, injuries or any other concerns or problems.     Stroke w/u negative for acute stroke. MRI showed Well-circumscribed 11 mm extra-axial lesion in the left superior cerebral convexity may represent a meningioma. Discussed with Dr Marisel Grey with neurology, episode likely a complex migraine, recommending OP f/u with neurosurgery for possible meningioma. Sxs resolved. Patient ambulating multiple laps around nursing unit without any difficulties. Patient stable for discharge.      DISCHARGE DIAGNOSES / PLAN:      Complex Migraine  -sxs now resolved   -MRI brain negative for stroke. -neurology following   -hgb a1c and lipid panel ok   -echo showing very small PFO, pt asymptomatic can continue to f/u with pcp     Hypertensive Urgency  -elevated on admission, currently controlled   -pt has been monitoring BP at home with pcp, avg BP at home over last 2 months 120s/80s.   -continue to f/u with pcp for mgmt       MRI brain   FINDINGS:    The ventricles are midline without hydrocephalus. Well-circumscribed 11 mm extra-axial lesion in the left superior cerebral  convexity may represent a meningioma. There is no significant white matter  disease. There is no acute infarction. The major intracranial vascular flow-voids are patent. Air-fluid level in the right sphenoid sinus     IMPRESSION:  No evidence for acute infarction. Well-circumscribed 11 mm extra-axial lesion in the left superior cerebral  convexity may represent a meningioma  Air-fluid level right sphenoid sinus       ECHO  SUMMARY:  Left ventricle: Systolic function was normal. Ejection fraction was  estimated to be 60 %. There were no regional wall motion abnormalities. Atrial septum: There was a very small right-to-left shunt. INDICATIONS: TIA/CVA. PROCEDURE: This was a routine study.  The study included complete 2D  imaging, M-mode, complete spectral Doppler, and color Doppler. The heart  rate was 84 bpm, at the start of the study. Systolic blood pressure was  127 mmHg, at the start of the study. Diastolic blood pressure was 70 mmHg, at the start of the study. Intravenous contrast (agitated saline) was  administered to evaluate intracardiac shunting. Image quality was good. LEFT VENTRICLE: Size was normal. Systolic function was normal. Ejection  fraction was estimated to be 60 %. There were no regional wall motion  abnormalities. RIGHT VENTRICLE: The size was normal. Systolic function was normal.  LEFT ATRIUM: Size was normal.  ATRIAL SEPTUM: There was a very small right-to-left shunt. RIGHT ATRIUM: Size was normal.  MITRAL VALVE: Normal valve structure. AORTIC VALVE: Normal valve structure. TRICUSPID VALVE: Normal valve structure. PULMONIC VALVE: Not well visualized, but normal Doppler findings. AORTA: The root exhibited normal size. PERICARDIUM: There was no pericardial effusion. PENDING TEST RESULTS:   At the time of discharge the following test results are still pending: none    FOLLOW UP APPOINTMENTS:    Follow-up Information     Follow up With Details Comments [address] RUDY Monet MD In 1 week hospital follow up  [address]  [facility] [address] [place] [address]  [phone]      Santhosh Martinez MD  Follow up with neurosurgery for the meningoma see on your  [facility]  [phone]             ADDITIONAL CARE RECOMMENDATIONS:   1. Follow up with your pcp within 1 week for hospital follow up and continued management of your high blood pressure. 2. Follow up with neurosurgery (see contact information above) for the meningoma seen on your MRI.      DIET: Cardiac    ACTIVITY: as tolerated    WOUND CARE: none    EQUIPMENT needed: none      DISCHARGE MEDICATIONS:  Current Discharge Medication List      CONTINUE these medications which have NOT CHANGED Details   cholecalciferol, vitamin D3, (VITAMIN D3) 2,000 unit tab Take  by mouth every other day. FERROUS FUMARATE (IRON PO) Take  by mouth every other day. b complex vitamins tablet Take 1 Tab by mouth Three (3) times a week. NOTIFY YOUR PHYSICIAN FOR ANY OF THE FOLLOWING:   Fever over 101 degrees for 24 hours. Chest pain, shortness of breath, fever, chills, nausea, vomiting, diarrhea, change in mentation, falling, weakness, bleeding. Severe pain or pain not relieved by medications. Or, any other signs or symptoms that you may have questions about. DISPOSITION:   X Home With:   OT  PT  HH  RN       Long term SNF/Inpatient Rehab    Independent/assisted living    Hospice    Other:       PATIENT CONDITION AT DISCHARGE:     Functional status    Poor     Deconditioned    X Independent      Cognition    X Lucid     Forgetful     Dementia      Catheters/lines (plus indication)    Chou     PICC     PEG    X None      Code status    X Full code     DNR      PHYSICAL EXAMINATION AT DISCHARGE:  General: No acute distress, cooperative, pleasant   EENT: EOMI. Anicteric sclerae. Oral mucous moist, oropharynx benign  Resp: CTA bilaterally. No wheezing/rhonchi/rales. No accessory muscle use  CV: Regular rhythm, normal rate, no murmurs, gallops, rubs  GI: Soft, non distended, non tender. normoactive bowel sounds,   Extremities: No edema, warm, 2+ pulses throughout  Neurologic: Moves all extremities. AAOx3, CN II-XII grossly intact  Psych: Good insight. Not anxious nor agitated.   Skin: Good Turgor, no rashes or ulcers    CHRONIC MEDICAL DIAGNOSES:  Problem List as of 4/4/2018  Date Reviewed: 4/3/2018          Codes Class Noted - Resolved    * (Principal)TIA (transient ischemic attack) ICD-10-CM: G45.9  ICD-9-CM: 435.9  4/3/2018 - Present        Fibroid, uterine ICD-10-CM: D25.9  ICD-9-CM: 218.9  Unknown - Present        Polycystic kidney disease ICD-10-CM: Q61.3  ICD-9-CM: 753.12  Unknown - Present Greater than 30 minutes were spent with the patient on counseling and coordination of care    Signed:   Reyna Lugo NP  4/4/2018  9:36 AM

## 2018-04-04 NOTE — PROGRESS NOTES
physical Therapy neuro EVALUATION/discharge     Patient: Jelani Brown (89 y.o. female)  Date: 4/4/2018  Primary Diagnosis: TIA (transient ischemic attack)        Precautions:       ASSESSMENT :  Based on the objective data described below, the patient presents with baseline functional mobility. She came to the ER with HA, L side facial numbness and feeling \"tongue tied,\" however all of her symptoms have resolved. She was received walking the halls. Spoke with pt about her PLOF and home set up. She was able to ambulate 600 ft without LOB or instability and perform 2 flights of stairs without use of rails. Pt continued to ambulate at end of session. Spoke with her about s/s of stroke and encouraged her to continue seeking medical treatment if similar symptoms were to ever effect her again in the future. No further PT needs at hospital or home. Will sign off at this time. Skilled physical therapy is not indicated at this time. PLAN :  Discharge Recommendations: None  Further Equipment Recommendations for Discharge: none       SUBJECTIVE:   Patient stated I just felt tongue twisted.     OBJECTIVE DATA SUMMARY:   HISTORY:    Past Medical History:   Diagnosis Date    Anemia     Fibroid, uterine     Polycystic kidney disease      Past Surgical History:   Procedure Laterality Date    HX HERNIA REPAIR Left ~1988    inguinal     HX HERNIA REPAIR  ~7535    umbilical     HX OTHER SURGICAL Right as a child/teen    benign tumor removal X2     Prior Level of Function/Home Situation: Independent  Personal factors and/or comorbidities impacting plan of care: None    Home Situation  Home Environment: Private residence  # Steps to Enter: 4  Rails to Enter: Yes  Hand Rails : Bilateral  One/Two Story Residence: Two story  # of Interior Steps: 15  Interior Rails: Right  Living Alone: No  Support Systems: Family member(s)  Patient Expects to be Discharged to[de-identified] Private residence  Current DME Used/Available at Home: None    EXAMINATION/PRESENTATION/DECISION MAKING:   Critical Behavior:  Neurologic State: Alert  Orientation Level: Oriented X4  Cognition: Appropriate decision making, Appropriate for age attention/concentration, Follows commands  Safety/Judgement: Awareness of environment  Hearing: Auditory  Auditory Impairment: None  Skin:  Appears intact  Range Of Motion:  AROM: Within functional limits                       Strength:    Strength: Within functional limits                    Tone & Sensation:   Tone: Normal              Sensation: Intact               Coordination:  Coordination: Within functional limits  Vision:      Functional Mobility:  Bed Mobility:   Received walking the halls     Transfers:  Sit to Stand: Independent  Stand to Sit: Independent                       Balance:   Sitting: Intact  Standing: Intact  Ambulation/Gait Training:     Gait Description (WDL): Within defined limits    Walked multiple laps around the halls         Stair Training:   Performed 2 flights without use of rails.  No LOB or instability       Functional Measure:  Cheek Balance Test:    Sitting to Standin  Standing Unsupported: 4  Sitting with Back Unsupported: 4  Standing to Sittin  Transfers: 4  Standing Unsupported with Eyes Closed: 4  Standing Unsupported with Feet Together: 4  Reach Forward with Outstretched Arm: 4   Object: 4  Turn to Look Over Shoulders: 4  Turn 360 Degrees: 4  Alternate Foot on Step/Stool: 4  Standing Unsupported One Foot in Front: 4  Stand on One Le  Total: 56         56=Maximum possible score;   0-20=High fall risk  21-40=Moderate fall risk   41-56=Low fall risk     Cheek Balance Test and G-code impairment scale:  Percentage of Impairment CH    0%   CI    1-19% CJ    20-39% CK    40-59% CL    60-79% CM    80-99% CN     100%   Cheek   Score 0-56 56 45-55 34-44 23-33 12-22 1-11 0     Pain:  Pain Scale 1: Numeric (0 - 10)  Pain Intensity 1: 0        Activity Tolerance:   No apparent distress Please refer to the flowsheet for vital signs taken during this treatment. After treatment:   [x]         Patient left in no apparent distress walking the halls  []         Patient left in no apparent distress in bed  []         Call bell left within reach  [x]         Nursing notified  []         Caregiver present  []         Bed alarm activated    COMMUNICATION/EDUCATION:   Patient was educated regarding Her deficit(s) of L side numbness as this relates to Her diagnosis of r/o TIA. She demonstrated Excellent understanding as evidenced by verbal recall. Patient and/or family was verbally educated on the BE FAST acronym for signs/symptoms of CVA and TIA. BE FAST was written on patient's communication board  for visual education and reinforcement. All questions answered with patient indicating excellent understanding. [x]   Fall prevention education was provided and the patient/caregiver indicated understanding. [x]   Patient/family have participated as able and agree with findings and recommendations. []   Patient is unable to participate in plan of care at this time.     Findings and recommendations were discussed with: Registered Nurse    Thank you for this referral.  Jonatan Briggs, PT   Time Calculation: 15 mins

## 2018-04-05 LAB
ATRIAL RATE: 57 BPM
CALCULATED P AXIS, ECG09: 53 DEGREES
CALCULATED R AXIS, ECG10: 66 DEGREES
CALCULATED T AXIS, ECG11: 29 DEGREES
DIAGNOSIS, 93000: NORMAL
P-R INTERVAL, ECG05: 160 MS
Q-T INTERVAL, ECG07: 422 MS
QRS DURATION, ECG06: 72 MS
QTC CALCULATION (BEZET), ECG08: 410 MS
VENTRICULAR RATE, ECG03: 57 BPM

## 2018-04-06 ENCOUNTER — OFFICE VISIT (OUTPATIENT)
Dept: FAMILY MEDICINE CLINIC | Age: 48
End: 2018-04-06

## 2018-04-06 VITALS
DIASTOLIC BLOOD PRESSURE: 80 MMHG | BODY MASS INDEX: 21.37 KG/M2 | WEIGHT: 141 LBS | HEIGHT: 68 IN | RESPIRATION RATE: 18 BRPM | SYSTOLIC BLOOD PRESSURE: 140 MMHG | OXYGEN SATURATION: 99 % | HEART RATE: 76 BPM | TEMPERATURE: 97.7 F

## 2018-04-06 DIAGNOSIS — G43.909 MIGRAINE WITHOUT STATUS MIGRAINOSUS, NOT INTRACTABLE, UNSPECIFIED MIGRAINE TYPE: ICD-10-CM

## 2018-04-06 DIAGNOSIS — D32.9 MENINGIOMA (HCC): ICD-10-CM

## 2018-04-06 DIAGNOSIS — Z09 HOSPITAL DISCHARGE FOLLOW-UP: Primary | ICD-10-CM

## 2018-04-06 DIAGNOSIS — R03.0 ELEVATED BLOOD PRESSURE READING WITHOUT DIAGNOSIS OF HYPERTENSION: ICD-10-CM

## 2018-04-06 DIAGNOSIS — Q21.12 PFO (PATENT FORAMEN OVALE): ICD-10-CM

## 2018-04-06 PROBLEM — G45.9 TIA (TRANSIENT ISCHEMIC ATTACK): Status: RESOLVED | Noted: 2018-04-03 | Resolved: 2018-04-06

## 2018-04-06 NOTE — PROGRESS NOTES
Chief Complaint   Patient presents with    Migraine     Complex   [facility] Follow Up     complex migraine      1. Have you been to the ER, urgent care clinic since your last visit? Hospitalized since your last visit? Yes, hospitalized for complex migraine and CT scan showed meningioma    2. Have you seen or consulted any other health care providers outside of the [address] [place] [address] since your last visit? Include any pap smears or colon screening. No     The patient was counseled on the dangers of tobacco use, and was advised never to start. Reviewed strategies to maximize success, including never to start. KAREN Blackburn  Identified pt with two pt identifiers(name and ). Chief Complaint   Patient presents with    Migraine     Complex   [facility] Follow Up     complex migraine        1. Have you been to the ER, urgent care clinic since your last visit? Hospitalized since your last visit? NO    2. Have you seen or consulted any other health care providers outside of the [address] [place] [address] since your last visit? Include any pap smears or colon screening. NO    Today's provider has been notified of reason for visit, vitals and flowsheets obtained on patients. Patient received paperwork for advance directive during previous visit but has not completed at this time     Reviewed record In preparation for visit, huddled with provider and have obtained necessary documentation      Health Maintenance Due   Topic    DTaP/Tdap/Td series (1 - Tdap) Discussed with patient today and advised to follow up.  PAP AKA CERVICAL CYTOLOGY Discussed with patient today and advised to follow up.           Wt Readings from Last 3 Encounters:   18 142 lb 8 oz (64.6 kg)   18 142 lb 14.4 oz (64.8 kg)   02/15/18 142 lb 12.8 oz (64.8 kg)     Temp Readings from Last 3 Encounters:   18 98.7 °F (37.1 °C)   18 98.6 °F (37 °C) (Oral)   02/15/18 98.4 °F (36.9 °C)     BP Readings from Last 3 Encounters:   04/04/18 141/84   02/23/18 136/85   02/15/18 137/76     Pulse Readings from Last 3 Encounters:   04/04/18 76   02/23/18 76   02/15/18 69     There were no vitals filed for this visit. Learning Assessment:  :     No flowsheet data found. Depression Screening:  :     PHQ over the last two weeks 4/6/2018   Little interest or pleasure in doing things Several days   Feeling down, depressed or hopeless Several days   Total Score PHQ 2 2   Trouble falling or staying asleep, or sleeping too much -   Feeling tired or having little energy -   Poor appetite or overeating -   Feeling bad about yourself - or that you are a failure or have let yourself or your family down -   Trouble concentrating on things such as school, work, reading or watching TV -   Moving or speaking so slowly that other people could have noticed; or the opposite being so fidgety that others notice -   Thoughts of being better off dead, or hurting yourself in some way -   PHQ 9 Score -   How difficult have these problems made it for you to do your work, take care of your home and get along with others -       Fall Risk Assessment:  :     No flowsheet data found. Abuse Screening:  :     No flowsheet data found. ADL Screening:  :     No flowsheet data found. ACP is not on file, advised to return. Medication reconciliation up to date and corrected with patient at this time.

## 2018-04-06 NOTE — MR AVS SNAPSHOT
[address] [facility] 
 
 
 [address] [facility] 
[address] [place] [address] 
[phone] Patient: Amna Bruce MRN: [id number] :1970 Visit Information Date & Time Provider Department Dept. Phone Encounter #  
 2018 10:20 AM Dewey Frankel Rosa Pound, MD [place] [phone] [id number] Upcoming Health Maintenance Date Due DTaP/Tdap/Td series (1 - Tdap) 1989 PAP AKA CERVICAL CYTOLOGY 1989 Allergies as of 2018  Review Complete On: 2018 By: Rylee Lama LPN Severity Noted Reaction Type Reactions Sulfa (Sulfonamide Antibiotics) Low 2018    Rash Stated that she breaks out on a rash Current Immunizations  Never Reviewed No immunizations on file. Not reviewed this visit Vitals BP Pulse Temp Resp Height(growth percentile) Weight(growth percentile) 140/80 (BP 1 Location: Left arm, BP Patient Position: Sitting) 76 97.7 °F (36.5 °C) (Oral) 18 5' 8\" (1.727 m) 141 lb (64 kg) SpO2 BMI OB Status Smoking Status 99% 21.44 kg/m2 Having regular periods Never Smoker BMI and BSA Data Body Mass Index Body Surface Area  
 21.44 kg/m 2 1.75 m 2 Preferred Pharmacy Pharmacy Name Phone 2018 [facility] [place], [facility] [address] Your Updated Medication List  
  
   
This list is accurate as of 18 11:31 AM.  Always use your most recent med list.  
  
  
  
  
 b complex vitamins tablet Take 1 Tab by mouth Three (3) times a week. IRON PO Take  by mouth every other day. VITAMIN D3 2,000 unit Tab Generic drug:  cholecalciferol (vitamin D3) Take  by mouth every other day. Patient Instructions TODAY, please go to: 
 Home cuff Manual cuff CHECK OUT If you received a referral, Show the  Please schedule the following appointments at [facility] OUT: 
· Blood pressure follow up with Dr. Negin Ogden in July 2018, no labs needed Okay to take excedrin migraine at FIRST sign of migraine See Neurosurgery Introducing \[facility]\"" & [facility] SERVICES! [address] introduces [employer] patient portal. Now you can access parts of your medical record, email your doctor's office, and request medication refills online. 1. In your internet browser, go to [email or web address] 2. Click on the First Time User? Click Here link in the Sign In box. You will see the New Member Sign Up page. 3. Enter your [facility] Access Code exactly as it appears below. You will not need to use this code after youve completed the sign-up process. If you do not sign up before the expiration date, you must request a new code. · [employer] [facility] Access Code: [email or web address] Expires: 4/12/2018  6:15 PM 
 
4. Enter the last four digits of your Social Security Number (xxxx) and Date of Birth (mm/dd/yyyy) as indicated and click Submit. You will be taken to the next sign-up page. 5. Create a [facility] ID. This will be your [employer] [facility] login ID and cannot be changed, so think of one that is secure and easy to remember. 6. Create a [facility] password. You can change your password at any time. 7. Enter your Password Reset Question and Answer. This can be used at a later time if you forget your password. 8. Enter your e-mail address. You will receive e-mail notification when new information is available in [address] 9. Click Sign Up. You can now view and download portions of your medical record. 10. Click the Download Summary menu link to download a portable copy of your medical information. If you have questions, please visit the Frequently Asked Questions section of the [employer] website. Remember, [facility] is NOT to be used for urgent needs. For medical emergencies, dial 911. Now available from your iPhone and Android! Please provide this summary of care documentation to your next provider. Your primary care clinician is listed as Marilyn . If you have any questions after today's visit, please call [phone].

## 2018-04-06 NOTE — PROGRESS NOTES
[address] [place] [address] Visit   04/06/2018  Patient ID: Marisol Reardon is a 50 y.o. female. Assessment/Plan:    Diagnoses and all orders for this visit:    1. Hospital discharge follow-up  2. Migraine without status migrainosus, not intractable, unspecified migraine type  Complicated migraine. Now resolved. Reviewed records. May use analgesic prn first sign of migraine. CTM    3. PFO (patent foramen ovale)  Noted on ECHO. 4. Meningioma ([address]  Likely meningioma noted on MRI. 11mm. Will follow up with Neurosurgery. 5. Elevated blood pressure reading without diagnosis of hypertension  Stable w/o medication. Continue lifestyle improvement. Home cuff read only about 2-4 points lower than manual and automated today. CTM. See patient instructions for more. Counselled pt on Patient health concerns and plans. Patient was offered a choice/choices in the treatment plan today. Reviewed return precautions as appropriate. Patient expresses understanding of the plan and agrees with recommendations. Patient Instructions   TODAY, please go to:   Home cuff   Manual cuff   CHECK OUT     If you received a referral, Show the        Please schedule the following appointments at [address] [place] [address]:  · Blood pressure follow up with Dr. Otoniel Bhakta in July 2018, no labs needed      Okay to take excedrin migraine at FIRST sign of migraine  See Neurosurgery    Subjective:   HPI:  Marisol Reardon is a 50 y.o. female being seen for:   Chief Complaint   Patient presents with   Mammie Muster Migraine     Complex   [facility] Follow Up     complex migraine        Elevated BPs  ·  home BPs, 128/92, 125/77, 118/86, 127/81, 107/64  · May have some spikes, when she does not walk for a few days or eats poorly    ED follow up  · Ate poorly Easter weekend  ·  4/3/18 developed numbness and tingling on left side, down neck. Sharp pain on top of head, heart fluttering, tongue felt heavy.  [facility]ent to [facility], BP was high, redirected to ED. ED experience was scary because they needed to due stroke protocol. · Found to have meningioma 11mm. Has number will call and schedule appt. Dr Pollock [facility]  Otherwise as noted in HPI    Social History     Social History Narrative    Lives with spouse and dtrs/dtr are in college. [address]     History   Smoking Status    Never Smoker   Smokeless Tobacco    Never Used     ? Objective:     Visit Vitals    /80 (BP 1 Location: Left arm, BP Patient Position: Sitting)    Pulse 76    Temp 97.7 °F (36.5 °C) (Oral)    Resp 18    Ht 5' 8\" (1.727 m)    Wt 141 lb (64 kg)    SpO2 99%    BMI 21.44 kg/m2     . Naoma Broccoli BP Readings from Last 3 Encounters:   04/06/18 140/80   04/04/18 141/84   02/23/18 136/85           Physical Exam   Constitutional: She appears well-developed and well-nourished. No distress. Pulmonary/Chest: Effort normal. No respiratory distress. Neurological: She is alert. Psychiatric: She has a normal mood and affect. Her behavior is normal.       Allergies   Allergen Reactions    Sulfa (Sulfonamide Antibiotics) Rash     Stated that she breaks out on a rash      Prior to Admission medications    Medication Sig Start Date End Date Taking? Authorizing Provider   cholecalciferol, vitamin D3, (VITAMIN D3) 2,000 unit tab Take  by mouth every other day. Yes Historical Provider   FERROUS FUMARATE (IRON PO) Take  by mouth every other day. Yes Historical Provider   b complex vitamins tablet Take 1 Tab by mouth Three (3) times a week.    Yes Historical Provider     Patient Active Problem List   Diagnosis Code    Fibroid, uterine D25.9    Polycystic kidney disease Q61.3    TIA (transient ischemic attack) G45.9    Meningioma ([address] D32.9    PFO (patent foramen ovale) Q21.1    Migraine G43.909

## 2018-04-06 NOTE — PATIENT INSTRUCTIONS
TODAY, please go to:   Home cuff   Manual cuff   CHECK OUT     If you received a referral, Show the        Please schedule the following appointments at [address] [place] [address]:  · Blood pressure follow up with Dr. Elenita Mata in July 2018, no labs needed      Okay to take excedrin migraine at FIRST sign of migraine  See Neurosurgery

## 2018-07-25 ENCOUNTER — OFFICE VISIT (OUTPATIENT)
Dept: FAMILY MEDICINE CLINIC | Age: 48
End: 2018-07-25

## 2018-07-25 VITALS
BODY MASS INDEX: 21.99 KG/M2 | HEART RATE: 75 BPM | RESPIRATION RATE: 14 BRPM | OXYGEN SATURATION: 99 % | HEIGHT: 68 IN | SYSTOLIC BLOOD PRESSURE: 142 MMHG | TEMPERATURE: 97.9 F | WEIGHT: 145.1 LBS | DIASTOLIC BLOOD PRESSURE: 90 MMHG

## 2018-07-25 DIAGNOSIS — R03.0 ELEVATED BLOOD PRESSURE READING WITHOUT DIAGNOSIS OF HYPERTENSION: Primary | ICD-10-CM

## 2018-07-25 DIAGNOSIS — D32.9 MENINGIOMA (HCC): ICD-10-CM

## 2018-07-25 RX ORDER — LISINOPRIL 10 MG/1
5 TABLET ORAL DAILY
Qty: 30 TAB | Refills: 1 | Status: SHIPPED | OUTPATIENT
Start: 2018-07-25 | End: 2018-08-03 | Stop reason: SDUPTHER

## 2018-07-25 NOTE — PROGRESS NOTES
KAREN Hicks  Identified pt with two pt identifiers(name and ). Chief Complaint   Patient presents with    Blood Pressure Check    Headache       1. Have you been to the ER, urgent care clinic since your last visit? Hospitalized since your last visit? No    2. Have you seen or consulted any other health care providers outside of the [facility] since your last visit? Include any pap smears or colon screening. Dr. Elsi Gardner and had a colonoscopy done with Dr. Vimal Barboza at [address] [place] [address]. In the event something were to happen to you and you were unable to speak on your behalf, do you have an Advance Directive/ Living Will in place stating your wishes? NO    If yes, do we have a copy on file NO    If no, would you like information:   Pt declined. Medication reconciliation up to date and corrected with patient at this time. Today's provider has been notified of reason for visit, vitals and flowsheets obtained on patients. Reviewed record in preparation for visit, huddled with provider and have obtained necessary documentation.       Health Maintenance Due   Topic    DTaP/Tdap/Td series (1 - Tdap)    PAP AKA CERVICAL CYTOLOGY        Wt Readings from Last 3 Encounters:   18 145 lb 1.6 oz (65.8 kg)   18 141 lb (64 kg)   18 142 lb 8 oz (64.6 kg)     Temp Readings from Last 3 Encounters:   18 97.7 °F (36.5 °C) (Oral)   18 98.7 °F (37.1 °C)   18 98.6 °F (37 °C) (Oral)     BP Readings from Last 3 Encounters:   18 140/80   18 141/84   18 136/85     Pulse Readings from Last 3 Encounters:   18 76   18 76   18 76     Vitals:    18 1406 18 1411   BP: 143/89 142/90   Pulse: 75    Resp: 14    Temp: 97.9 °F (36.6 °C)    TempSrc: Temporal    SpO2: 99%    Weight: 145 lb 1.6 oz (65.8 kg)    Height: 5' 8\" (1.727 m)    PainSc:   0 - No pain    LMP: 2018         Learning Assessment:  :     Learning Assessment 7/25/2018   PRIMARY LEARNER Patient   PRIMARY LANGUAGE ENGLISH   LEARNER PREFERENCE PRIMARY OTHER (COMMENT)   ANSWERED BY patient   RELATIONSHIP SELF       Depression Screening:  :     PHQ over the last two weeks 4/6/2018   Little interest or pleasure in doing things Several days   Feeling down, depressed, irritable, or hopeless Several days   Total Score PHQ 2 2   Trouble falling or staying asleep, or sleeping too much -   Feeling tired or having little energy -   Poor appetite, weight loss, or overeating -   Feeling bad about yourself - or that you are a failure or have let yourself or your family down -   Trouble concentrating on things such as school, work, reading, or watching TV -   Moving or speaking so slowly that other people could have noticed; or the opposite being so fidgety that others notice -   Thoughts of being better off dead, or hurting yourself in some way -   PHQ 9 Score -   How difficult have these problems made it for you to do your work, take care of your home and get along with others -       Fall Risk Assessment:  :     Fall Risk Assessment, last 12 mths 7/25/2018   Able to walk? Yes   Fall in past 12 months?  No         ADL Screening:  :     ADL Assessment 7/25/2018   Feeding yourself No Help Needed   Getting from bed to chair No Help Needed   Getting dressed No Help Needed   Bathing or showering No Help Needed   Walk across the room (includes cane/walker) No Help Needed   Using the telphone No Help Needed   Taking your medications No Help Needed   Preparing meals No Help Needed   Managing money (expenses/bills) No Help Needed   Moderately strenuous housework (laundry) No Help Needed   Shopping for personal items (toiletries/medicines) No Help Needed   Shopping for groceries No Help Needed   Driving No Help Needed   Climbing a flight of stairs No Help Needed   Getting to places beyond walking distances No Help Needed

## 2018-07-25 NOTE — PATIENT INSTRUCTIONS
Elevated Blood Pressure: Care Instructions  Your Care Instructions    Blood pressure is a measure of how hard the blood pushes against the walls of your arteries. It's normal for blood pressure to go up and down throughout the day. But if it stays up over time, you have high blood pressure. Two numbers tell you your blood pressure. The first number is the systolic pressure. It shows how hard the blood pushes when your heart is pumping. The second number is the diastolic pressure. It shows how hard the blood pushes between heartbeats, when your heart is relaxed and filling with blood. An ideal blood pressure in adults is less than 120/80 (say \"120 over 80\"). High blood pressure is 140/90 or higher. You have high blood pressure if your top number is 140 or higher or your bottom number is 90 or higher, or both. The main test for high blood pressure is simple, fast, and painless. To diagnose high blood pressure, your doctor will test your blood pressure at different times. After testing your blood pressure, your doctor may ask you to test it again when you are home. If you are diagnosed with high blood pressure, you can work with your doctor to make a long-term plan to manage it. Follow-up care is a key part of your treatment and safety. Be sure to make and go to all appointments, and call your doctor if you are having problems. It's also a good idea to know your test results and keep a list of the medicines you take. How can you care for yourself at home? · Do not smoke. Smoking increases your risk for heart attack and stroke. If you need help quitting, talk to your doctor about stop-smoking programs and medicines. These can increase your chances of quitting for good. · Stay at a healthy weight. · Try to limit how much sodium you eat to less than 2,300 milligrams (mg) a day. Your doctor may ask you to try to eat less than 1,500 mg a day. · Be physically active.  Get at least 30 minutes of exercise on most days of the week. Walking is a good choice. You also may want to do other activities, such as running, swimming, cycling, or playing tennis or team sports. · Avoid or limit alcohol. Talk to your doctor about whether you can drink any alcohol. · Eat plenty of fruits, vegetables, and low-fat dairy products. Eat less saturated and total fats. · Learn how to check your blood pressure at home. When should you call for help? Call your doctor now or seek immediate medical care if:  ? · Your blood pressure is much higher than normal (such as 180/110 or higher). ? · You think high blood pressure is causing symptoms such as:  ¨ Severe headache. ¨ Blurry vision. ? Watch closely for changes in your health, and be sure to contact your doctor if:  ? · You do not get better as expected. Where can you learn more? Go to http://reyes-kelly.info/. Enter S295 in the search box to learn more about \"Elevated Blood Pressure: Care Instructions. \"  Current as of: September 21, 2016  Content Version: 11.4  © 2688-7855 [employer]. Care instructions adapted under license by [employer] (which disclaims liability or warranty for this information). If you have questions about a medical condition or this instruction, always ask your healthcare professional. [address] any warranty or liability for your use of this information.

## 2018-07-25 NOTE — MR AVS SNAPSHOT
[address] [facility] [place] 
 
 
 [facility] 
[address] [place] [address] 
[phone] Patient: Tc Davis MRN: [id number] :1970 Visit Information Date & Time Provider Department Dept. Phone Encounter #  
 2018  1:50 PM Mark Tavarez NP [facility] [phone] [id number] Follow-up Instructions Return in about 1 week (around 2018) for Elevated BP F/U with Dr. Claritza Veliz. Upcoming Health Maintenance Date Due DTaP/Tdap/Td series (1 - Tdap) 1989 PAP AKA CERVICAL CYTOLOGY 1989 Influenza Age 5 to Adult 2018 Allergies as of 2018  Review Complete On: 2018 By: Mark Tavarez NP Severity Noted Reaction Type Reactions Sulfa (Sulfonamide Antibiotics) Low 2018    Rash Stated that she breaks out on a rash Current Immunizations  Never Reviewed No immunizations on file. Not reviewed this visit You Were Diagnosed With   
  
 Codes Comments Elevated blood pressure reading without diagnosis of hypertension    -  Primary ICD-10-CM: R03.0 ICD-9-CM: 796.2 Meningioma ([address]     ICD-10-CM: D32.9 ICD-9-CM: 225.2 Vitals BP Pulse Temp Resp Height(growth percentile) Weight(growth percentile) 142/90 (BP 1 Location: Left arm, BP Patient Position: Sitting) 75 97.9 °F (36.6 °C) (Temporal) 14 5' 8\" (1.727 m) 145 lb 1.6 oz (65.8 kg) LMP SpO2 BMI OB Status Smoking Status 2018 (Approximate) 99% 22.06 kg/m2 Having regular periods Never Smoker Vitals History BMI and BSA Data Body Mass Index Body Surface Area 22.06 kg/m 2 1.78 m 2 Preferred Pharmacy Pharmacy Name Phone [address], [facility] [address] Your Updated Medication List  
  
   
This list is accurate as of 18  3:14 PM.  Always use your most recent med list.  
  
  
  
  
 b complex vitamins tablet Take 1 Tab by mouth Three (3) times a week. IRON PO Take  by mouth every other day. lisinopril 10 mg tablet Commonly known as:  Marisarannnorma Shows Take 0.5 Tabs by mouth daily. VITAMIN D3 2,000 unit Tab Generic drug:  cholecalciferol (vitamin D3) Take  by mouth every other day. Prescriptions Sent to Pharmacy Refills  
 lisinopril (PRINIVIL, ZESTRIL) 10 mg tablet 1 Sig: Take 0.5 Tabs by mouth daily. Class: Normal  
 Pharmacy: [facility], [address] [place] [address] [address] [place] [address] #: [phone] Route: Oral  
  
We Performed the Following REFERRAL TO NEUROSURGERY [LJH12 Custom] Follow-up Instructions Return in about 1 week (around 8/1/2018) for Elevated BP F/U with Dr. Akua Garcia. Referral Information Referral ID Referred By Referred To  
  
 [id number] Maxie Denver, MD   
   [address] [place] [address]. [place], [address] [place] [address] Road Phone: [phone] Fax: [phone] Visits Status Start Date End Date 1 New Request 7/25/18 7/25/19 If your referral has a status of pending review or denied, additional information will be sent to support the outcome of this decision. Patient Instructions Elevated Blood Pressure: Care Instructions Your Care Instructions Blood pressure is a measure of how hard the blood pushes against the walls of your arteries. It's normal for blood pressure to go up and down throughout the day. But if it stays up over time, you have high blood pressure. Two numbers tell you your blood pressure. The first number is the systolic pressure. It shows how hard the blood pushes when your heart is pumping. The second number is the diastolic pressure. It shows how hard the blood pushes between heartbeats, when your heart is relaxed and filling with blood.  An ideal blood pressure in adults is less than 120/80 (say \"120 over 80\"). High blood pressure is 140/90 or higher. You have high blood pressure if your top number is 140 or higher or your bottom number is 90 or higher, or both. The main test for high blood pressure is simple, fast, and painless. To diagnose high blood pressure, your doctor will test your blood pressure at different times. After testing your blood pressure, your doctor may ask you to test it again when you are home. If you are diagnosed with high blood pressure, you can work with your doctor to make a long-term plan to manage it. Follow-up care is a key part of your treatment and safety. Be sure to make and go to all appointments, and call your doctor if you are having problems. It's also a good idea to know your test results and keep a list of the medicines you take. How can you care for yourself at home? · Do not smoke. Smoking increases your risk for heart attack and stroke. If you need help quitting, talk to your doctor about stop-smoking programs and medicines. These can increase your chances of quitting for good. · Stay at a healthy weight. · Try to limit how much sodium you eat to less than 2,300 milligrams (mg) a day. Your doctor may ask you to try to eat less than 1,500 mg a day. · Be physically active. Get at least 30 minutes of exercise on most days of the week. Walking is a good choice. You also may want to do other activities, such as running, swimming, cycling, or playing tennis or team sports. · Avoid or limit alcohol. Talk to your doctor about whether you can drink any alcohol. · Eat plenty of fruits, vegetables, and low-fat dairy products. Eat less saturated and total fats. · Learn how to check your blood pressure at home. When should you call for help? Call your doctor now or seek immediate medical care if: 
? · Your blood pressure is much higher than normal (such as 180/110 or higher). ? · You think high blood pressure is causing symptoms such as: ¨ Severe headache. ¨ Blurry vision. ? Watch closely for changes in your health, and be sure to contact your doctor if: 
? · You do not get better as expected. Where can you learn more? Go to http://reyes-kelly.info/. Enter G950 in the search box to learn more about \"Elevated Blood Pressure: Care Instructions. \" Current as of: September 21, 2016 Content Version: 11.4 © 1575-7550 [employer]. Care instructions adapted under license by [employer] (which disclaims liability or warranty for this information). If you have questions about a medical condition or this instruction, always ask your healthcare professional. [place] [address] any warranty or liability for your use of this information. Introducing [facility]! [employer] introduces [employer] patient portal. Now you can access parts of your medical record, email your doctor's office, and request medication refills online. 1. In your internet browser, go to [email or web address]. [employer] 2. Click on the First Time User? Click Here link in the Sign In box. You will see the New Member Sign Up page. 3. Enter your [employer] Access Code exactly as it appears below. You will not need to use this code after youve completed the sign-up process. If you do not sign up before the expiration date, you must request a new code. · [employer] Access Code: [email or web address] Expires: 10/23/2018  3:14 PM 
 
4. Enter the last four digits of your Social Security Number (xxxx) and Date of Birth (mm/dd/yyyy) as indicated and click Submit. You will be taken to the next sign-up page. 5. Create a [employer] ID. This will be your [employer] login ID and cannot be changed, so think of one that is secure and easy to remember. 6. Create a [employer] password. You can change your password at any time. 7. Enter your Password Reset Question and Answer. This can be used at a later time if you forget your password. 8. Enter your e-mail address. You will receive e-mail notification when new information is available in [address] 9. Click Sign Up. You can now view and download portions of your medical record. 10. Click the Download Summary menu link to download a portable copy of your medical information. If you have questions, please visit the Frequently Asked Questions section of the [employer] website. Remember, [employer] is NOT to be used for urgent needs. For medical emergencies, dial 911. Now available from your iPhone and Android! Please provide this summary of care documentation to your next provider. Your primary care clinician is listed as Court Terry. If you have any questions after today's visit, please call [phone].

## 2018-07-25 NOTE — PROGRESS NOTES
Chief Complaint   Patient presents with    Blood Pressure Check    Headache         HPI:  The patient is a 50 y.o. female who presents today for a follow up appointment. No hospital, ER or specialist visits since last primary care visit except as noted below. Elevated Blood Pressure:  Sunday 132/76, Monday 145/99, 136/97, 124/84, today 116/87. She can tell when her blood pressure is elevated. She usually gets a HA. She reports that today she \"had an awareness that something is off\". She denies her discomfort/\"feeling\" to be bad enough to go to the ER but she is concerned about her diastolic being in the 39D. Her HAs have only been this week. Her diet has been poor recently and she has not been exercising. She also reports an increase in her life stressors recently. She denies CP/SOB/RAQUEL. BP Readings from Last 3 Encounters:   07/25/18 142/90   04/06/18 140/80   04/04/18 141/84     Neurosurgery/Meningioma:   She forgot about her appointment and still needs to reschedule her appointment for f/u about the meningioma that was found on MRI in the ER on 4/3/2018. Review of Systems  A comprehensive review of systems was negative except for that written in the HPI.     Patient Active Problem List   Diagnosis Code    Fibroid, uterine D25.9    Polycystic kidney disease Q61.3    Meningioma ([address] D32.9    PFO (patent foramen ovale) Q21.1    Migraine G43.909    Elevated blood pressure reading without diagnosis of hypertension R03.0       Past Medical History:   Diagnosis Date    Anemia     Fibroid, uterine     Meningioma ([address] 4/6/2018    PFO (patent foramen ovale) 4/6/2018    Polycystic kidney disease        Past Surgical History:   Procedure Laterality Date    HX HERNIA REPAIR Left ~1988    inguinal     HX HERNIA REPAIR  ~4630    umbilical     HX OTHER SURGICAL Right as a child/teen    benign tumor removal X2       Social History   Substance Use Topics    Smoking status: Never Smoker    Smokeless tobacco: Never Used    Alcohol use No       Family History   Problem Relation Age of Onset    Hypertension Mother     Kidney Disease Mother      transplant    Cancer Mother      lung    No Known Problems Father     Heart Disease Maternal Grandmother     Heart Attack Maternal Grandmother     Diabetes Maternal Grandmother     Other Maternal Grandmother      DR or glaucoma or both    Kidney Disease Maternal Grandfather     No Known Problems Paternal Grandmother     No Known Problems Paternal Grandfather     Cancer Other     Diabetes Other     Heart Disease Other     Kidney Disease Other     Glaucoma Other     Cataract Other     Seizures Daughter      childhood    Asthma Daughter      childhood    Attention Deficit Hyperactivity Disorder Daughter     Asthma Daughter      childhood    Other Daughter      umbilical hernia repair       Outpatient Prescriptions Marked as Taking for the 7/25/18 encounter (Office Visit) with Alexandra Hickey NP   Medication Sig Dispense Refill    lisinopril (PRINIVIL, ZESTRIL) 10 mg tablet Take 0.5 Tabs by mouth daily. 30 Tab 1    cholecalciferol, vitamin D3, (VITAMIN D3) 2,000 unit tab Take  by mouth every other day.  FERROUS FUMARATE (IRON PO) Take  by mouth every other day.  b complex vitamins tablet Take 1 Tab by mouth Three (3) times a week.          Allergies   Allergen Reactions    Sulfa (Sulfonamide Antibiotics) Rash     Stated that she breaks out on a rash        PE:  Visit Vitals    /90 (BP 1 Location: Left arm, BP Patient Position: Sitting)  Comment: taken manually with large cuff    Pulse 75    Temp 97.9 °F (36.6 °C) (Temporal)    Resp 14    Ht 5' 8\" (1.727 m)    Wt 145 lb 1.6 oz (65.8 kg)    LMP 07/01/2018 (Approximate)    SpO2 99%    BMI 22.06 kg/m2     Gen: alert, oriented, no acute distress  Head: normocephalic, atraumatic  Ears: external auditory canals clear, TMs without erythema or effusion  Eyes: pupils equal round reactive to light, sclera clear, conjunctiva clear  Oral: moist mucus membranes, no oral lesions, no pharyngeal inflammation or exudate  Neck: symmetric normal sized thyroid, no carotid bruits, no jugular vein distention  Resp: no increase work of breathing, lungs clear to ausculation bilaterally, no wheezing, rales or rhonchi  CV: S1, S2 normal.  No murmurs, rubs, or gallops. Abd: soft, not tender, not distended. No hepatosplenomegaly. Normal bowel sounds. No hernias. No abdominal or renal bruits. Neuro: cranial nerves intact, normal strength and movement in all extremities, reflexes and sensation intact and symmetric. Skin: no lesion or rash  Extremities: no cyanosis or edema    Assessment/Plan:    ICD-10-CM ICD-9-CM    1. Elevated blood pressure reading without diagnosis of hypertension R03.0 796.2 lisinopril (PRINIVIL, ZESTRIL) 10 mg tablet   2. Meningioma ([address] D32.9 225.2 REFERRAL TO NEUROSURGERY     Follow-up Disposition:  Return in about 1 week (around 8/1/2018) for Elevated BP F/U with Dr. Beena Suazo. reviewed diet, exercise and weight control  reviewed medications and side effects in detail - encouraged diet and exercise and decreased stress, discussed Lisinopril medication information. Pt to see Neurosurgery, Dr. Haydee Velez, for evaluation of her meningioma. Urgent referral placed today to setup an appointment. Health Maintenance reviewed - deferred to PCP. Recommended healthy diet low in carbohydrates, fats, sodium and cholesterol. Recommended regular cardiovascular exercise 3-6 times per week for 30-60 minutes daily. Chart is reviewed and updated today in the office. Records requested for other providers patient has seen and is currently seeing. Patient was offered a choice/choices in the treatment plan today. Patient expresses understanding of the plan and agrees with recommendations. Verbal and written instructions (see AVS) provided.  See patient instructions for more. Patient expresses understanding of diagnosis and treatment plan.

## 2018-08-03 ENCOUNTER — OFFICE VISIT (OUTPATIENT)
Dept: FAMILY MEDICINE CLINIC | Age: 48
End: 2018-08-03

## 2018-08-03 VITALS
OXYGEN SATURATION: 100 % | TEMPERATURE: 96.5 F | WEIGHT: 142.9 LBS | RESPIRATION RATE: 16 BRPM | SYSTOLIC BLOOD PRESSURE: 140 MMHG | BODY MASS INDEX: 21.66 KG/M2 | HEIGHT: 68 IN | HEART RATE: 68 BPM | DIASTOLIC BLOOD PRESSURE: 77 MMHG

## 2018-08-03 DIAGNOSIS — R03.0 ELEVATED BLOOD PRESSURE READING WITHOUT DIAGNOSIS OF HYPERTENSION: ICD-10-CM

## 2018-08-03 DIAGNOSIS — I10 ESSENTIAL HYPERTENSION: Primary | ICD-10-CM

## 2018-08-03 RX ORDER — LISINOPRIL 5 MG/1
5 TABLET ORAL DAILY
Qty: 90 TAB | Refills: 3 | Status: SHIPPED | OUTPATIENT
Start: 2018-08-03

## 2018-08-03 NOTE — PROGRESS NOTES
[address] [place] [address] Visit   08/03/2018  Patient ID: Viktoria Lundy is a 50 y.o. female. Assessment/Plan:      Diagnoses and all orders for this visit:    1. Essential hypertension  -     METABOLIC PANEL, BASIC  -     lisinopril (PRINIVIL, ZESTRIL) 5 mg tablet; Take 1 Tab by mouth daily. 2. Elevated blood pressure reading without diagnosis of hypertension  -     METABOLIC PANEL, BASIC  -     lisinopril (PRINIVIL, ZESTRIL) 5 mg tablet; Take 1 Tab by mouth daily. better control at home. Continue same dose. Due for BMP. See patient instructions for more. Counselled pt on Patient health concerns and plans. Patient was offered a choice/choices in the treatment plan today. Reviewed return precautions as appropriate. Patient expresses understanding of the plan and agrees with recommendations. Patient Instructions   . TODAY, please go to:   CHECK OUT - If you received a referral, Show the     Please schedule the following appointments at [address] [place] [address]:  Complete Physical Exam in Feb 2019    Today's Plan:  Continue lisinopril  _____________________   Consider signing up for [employer] to receive your results electronically. Subjective:   HPI:  KAREN Tesfaye is a 50 y.o. female being seen for:   Chief Complaint   Patient presents with    Blood Pressure Check     BP readings at home are 117/81 this AM.     · BP medication started last visit. Home readings are better than here today  · Diet is well controlled  · Exercising. · Stress is contributing. Work, family. She is setting boundaries. · Eats potassium rich foods- orange, sweet potato, banana, electrolyte water.              Screening and Prevention Due:  Health Maintenance Due   Topic Date Due    DTaP/Tdap/Td series (1 - Tdap) 11/27/1989    PAP AKA CERVICAL CYTOLOGY  11/27/1989         Review of Systems  Otherwise as noted in HPI    Social History     Social History Narrative    Lives with spouse and dtrs/dtr are in college. [address]     History   Smoking Status    Never Smoker   Smokeless Tobacco    Never Used     ? Objective:     Visit Vitals    /77 (BP 1 Location: Left arm, BP Patient Position: Sitting)    Pulse 68    Temp 96.5 °F (35.8 °C) (Oral)    Resp 16    Ht 5' 8\" (1.727 m)    Wt 142 lb 14.4 oz (64.8 kg)    SpO2 100%    BMI 21.73 kg/m2       BP Readings from Last 3 Encounters:   08/03/18 140/77   07/25/18 142/90   04/06/18 140/80       Wt Readings from Last 3 Encounters:   08/03/18 142 lb 14.4 oz (64.8 kg)   07/25/18 145 lb 1.6 oz (65.8 kg)   04/06/18 141 lb (64 kg)       Physical Exam   Constitutional: She appears well-developed and well-nourished. No distress. Pulmonary/Chest: Effort normal. No respiratory distress. Neurological: She is alert. Psychiatric: She has a normal mood and affect. Her behavior is normal.       Allergies   Allergen Reactions    Sulfa (Sulfonamide Antibiotics) Rash     Stated that she breaks out on a rash      Prior to Admission medications    Medication Sig Start Date End Date Taking? Authorizing Provider   lisinopril (PRINIVIL, ZESTRIL) 5 mg tablet Take 1 Tab by mouth daily. 8/3/18  Yes Hamilton Broderick. Loyd Garcia MD   cholecalciferol, vitamin D3, (VITAMIN D3) 2,000 unit tab Take  by mouth every other day. Yes Historical Provider   FERROUS FUMARATE (IRON PO) Take  by mouth every other day.    Yes Historical Provider     Patient Active Problem List   Diagnosis Code    Fibroid, uterine D25.9    Polycystic kidney disease Q61.3    Meningioma ([facility] [address] D32.9    PFO (patent foramen ovale) Q21.1    Migraine G43.909    Essential hypertension I10

## 2018-08-03 NOTE — PATIENT INSTRUCTIONS
Mariana Guaman TODAY, please go to:   CHECK OUT - If you received a referral, Show the     Please schedule the following appointments at [address] [place] [address]:  Complete Physical Exam in Feb 2019    Today's Plan:  Continue lisinopril  _____________________   Consider signing up for [employer] to receive your results electronically.

## 2018-08-03 NOTE — MR AVS SNAPSHOT
[address] [place] [address] 
[address] [facility] [address] 
[phone] Patient: Taylor Culp MRN: [id number] :1970 Visit Information Date & Time Provider Department Dept. Phone Encounter #  
 8/3/2018  4:00 PM Sarthak Givens MD [facility] [phone] [id number] Upcoming Health Maintenance Date Due DTaP/Tdap/Td series (1 - Tdap) 1989 PAP AKA CERVICAL CYTOLOGY 1989 Influenza Age 5 to Adult 2018* *Topic was postponed. The date shown is not the original due date. Allergies as of 8/3/2018  Review Complete On: 8/3/2018 By: Heidy Cortez RN Severity Noted Reaction Type Reactions Sulfa (Sulfonamide Antibiotics) Low 2018    Rash Stated that she breaks out on a rash Current Immunizations  Never Reviewed No immunizations on file. Not reviewed this visit Vitals BP Pulse Temp Resp Height(growth percentile) Weight(growth percentile) 140/77 (BP 1 Location: Left arm, BP Patient Position: Sitting) 68 96.5 °F (35.8 °C) (Oral) 16 5' 8\" (1.727 m) 142 lb 14.4 oz (64.8 kg) LMP SpO2 BMI OB Status Smoking Status 2018 100% 21.73 kg/m2 Having regular periods Never Smoker Vitals History BMI and BSA Data Body Mass Index Body Surface Area 21.73 kg/m 2 1.76 m 2 Preferred Pharmacy Pharmacy Name Phone [address], [address] Your Updated Medication List  
  
   
This list is accurate as of 8/3/18  5:25 PM.  Always use your most recent med list.  
  
  
  
  
 IRON PO Take  by mouth every other day. lisinopril 10 mg tablet Commonly known as:  Matias Paradise Take 0.5 Tabs by mouth daily. VITAMIN D3 2,000 unit Tab Generic drug:  cholecalciferol (vitamin D3) Take  by mouth every other day. Patient Instructions Mariana Guaman TODAY, please go to: CHECK OUT - If you received a referral, Show the  Please schedule the following appointments at [address] [place] [address]: 
Complete Physical Exam in Feb 2019 Today's Plan: 
Continue lisinopril 
_____________________ Consider signing up for [employer] to receive your results electronically. Introducing [facility]! Alexandria Cheema introduces [employer]ashion patient portal. Now you can access parts of your medical record, email your doctor's office, and request medication refills online. 1. In your internet browser, go to [email or web address] 2. Click on the First Time User? Click Here link in the Sign In box. You will see the New Member Sign Up page. 3. Enter your Affashion Access Code exactly as it appears below. You will not need to use this code after youve completed the sign-up process. If you do not sign up before the expiration date, you must request a new code. · Affashion Access Code: [email or web address] Expires: 10/23/2018  3:14 PM 
 
4. Enter the last four digits of your Social Security Number (xxxx) and Date of Birth (mm/dd/yyyy) as indicated and click Submit. You will be taken to the next sign-up page. 5. Create a Affashion ID. This will be your Affashion login ID and cannot be changed, so think of one that is secure and easy to remember. 6. Create a Affashion password. You can change your password at any time. 7. Enter your Password Reset Question and Answer. This can be used at a later time if you forget your password. 8. Enter your e-mail address. You will receive e-mail notification when new information is available in [address] 9. Click Sign Up. You can now view and download portions of your medical record. 10. Click the Download Summary menu link to download a portable copy of your medical information.  
 
If you have questions, please visit the Frequently Asked Questions section of the Toothpick. Remember, [employer] is NOT to be used for urgent needs. For medical emergencies, dial 911. Now available from your iPhone and Android! Please provide this summary of care documentation to your next provider. Your primary care clinician is listed as Jonathan Segal. If you have any questions after today's visit, please call [phone].

## 2018-08-03 NOTE — PROGRESS NOTES
KAREN Cintron  Identified pt with two pt identifiers(name and ). Chief Complaint   Patient presents with    Blood Pressure Check     BP readings at home are 117/81 this AM.       1. Have you been to the ER, urgent care clinic since your last visit? Hospitalized since your last visit? No    2. Have you seen or consulted any other health care providers outside of the [address] [place] [address] since your last visit? Include any pap smears or colon screening. No    Today's provider has been notified of reason for visit, vitals and flowsheets obtained on patients.        Reviewed record In preparation for visit, huddled with provider and have obtained necessary documentation      Health Maintenance Due   Topic    DTaP/Tdap/Td series (1 - Tdap)    PAP AKA CERVICAL CYTOLOGY     Influenza Age 5 to Adult        Wt Readings from Last 3 Encounters:   18 142 lb 14.4 oz (64.8 kg)   18 145 lb 1.6 oz (65.8 kg)   18 141 lb (64 kg)     Temp Readings from Last 3 Encounters:   18 97.9 °F (36.6 °C) (Temporal)   18 97.7 °F (36.5 °C) (Oral)   18 98.7 °F (37.1 °C)     BP Readings from Last 3 Encounters:   18 142/90   18 140/80   18 141/84     Pulse Readings from Last 3 Encounters:   18 75   18 76   18 76     Vitals:    18 1624   Weight: 142 lb 14.4 oz (64.8 kg)   Height: 5' 8\" (1.727 m)   PainSc:   0 - No pain         Learning Assessment:  :     Learning Assessment 2018   PRIMARY LEARNER Patient   PRIMARY LANGUAGE ENGLISH   LEARNER PREFERENCE PRIMARY OTHER (COMMENT)   ANSWERED BY patient   RELATIONSHIP SELF       Depression Screening:  :     PHQ over the last two weeks 2018   Little interest or pleasure in doing things Several days   Feeling down, depressed, irritable, or hopeless Several days   Total Score PHQ 2 2   Trouble falling or staying asleep, or sleeping too much -   Feeling tired or having little energy -   Poor appetite, weight loss, or overeating -   Feeling bad about yourself - or that you are a failure or have let yourself or your family down -   Trouble concentrating on things such as school, work, reading, or watching TV -   Moving or speaking so slowly that other people could have noticed; or the opposite being so fidgety that others notice -   Thoughts of being better off dead, or hurting yourself in some way -   PHQ 9 Score -   How difficult have these problems made it for you to do your work, take care of your home and get along with others -       Fall Risk Assessment:  :     Fall Risk Assessment, last 12 mths 7/25/2018   Able to walk? Yes   Fall in past 12 months? No       Abuse Screening:  :     No flowsheet data found. ADL Screening:  :     ADL Assessment 7/25/2018   Feeding yourself No Help Needed   Getting from bed to chair No Help Needed   Getting dressed No Help Needed   Bathing or showering No Help Needed   Walk across the room (includes cane/walker) No Help Needed   Using the telphone No Help Needed   Taking your medications No Help Needed   Preparing meals No Help Needed   Managing money (expenses/bills) No Help Needed   Moderately strenuous housework (laundry) No Help Needed   Shopping for personal items (toiletries/medicines) No Help Needed   Shopping for groceries No Help Needed   Driving No Help Needed   Climbing a flight of stairs No Help Needed   Getting to places beyond walking distances No Help Needed                 Medication reconciliation up to date and corrected with patient at this time.

## 2018-08-23 ENCOUNTER — TELEPHONE (OUTPATIENT)
Dept: FAMILY MEDICINE CLINIC | Age: 48
End: 2018-08-23

## 2018-08-23 LAB
BUN SERPL-MCNC: 15 MG/DL (ref 6–24)
BUN/CREAT SERPL: 12 (ref 9–23)
CALCIUM SERPL-MCNC: 9.1 MG/DL (ref 8.7–10.2)
CHLORIDE SERPL-SCNC: 106 MMOL/L (ref 96–106)
CO2 SERPL-SCNC: 21 MMOL/L (ref 20–29)
CREAT SERPL-MCNC: 1.22 MG/DL (ref 0.57–1)
GLUCOSE SERPL-MCNC: 82 MG/DL (ref 65–99)
INTERPRETATION: NORMAL
POTASSIUM SERPL-SCNC: 4.6 MMOL/L (ref 3.5–5.2)
SODIUM SERPL-SCNC: 139 MMOL/L (ref 134–144)

## 2018-08-23 NOTE — PROGRESS NOTES
CORRECTION: on further review, this is close to patient's baseline. Please CONTINUE LISINOPRIL. Return for lab next week, non fasting, to compare. No OV needed at this time.

## 2018-08-23 NOTE — TELEPHONE ENCOUNTER
Writer called patient with no answer. Message left to return call and ask for a nurse to receive her lab results.

## 2018-08-23 NOTE — PROGRESS NOTES
Please call patient. Creatinine is elevated since starting lisinopril. Please STOP lisinopril. Drink plenty of water. Return for lab next week to make sure Creatinine has improved. Once I see those labs, I will recommend an alternate blood pressure medication.

## 2018-08-24 NOTE — TELEPHONE ENCOUNTER
----- Message from Sg Doss sent at 8/24/2018  4:11 PM EDT -----  Regarding: Dr. Iván Macias  Pt returning missed regarding her test results.  Best contact [phone]

## 2018-08-24 NOTE — TELEPHONE ENCOUNTER
Spoke with patient after obtaining 2 patient identifiers  Patient given lab results . Advised her that MD stated to drink plenty of water and continue to take her lisinopril. Patient will make lab appointment next week non fasting. Verbalized understanding with no further questions noted.

## 2018-08-24 NOTE — PROGRESS NOTES
Writer called patient to notify of results and recommendations from Dr. Carolynn Arango. Patient did not answer. Writer left  requesting phone call back.

## 2018-08-24 NOTE — TELEPHONE ENCOUNTER
Writer called patient to notify of results and recommendations from Dr. Gail Norwood. Patient did not answer. Writer left  requesting phone call back.

## 2018-08-27 NOTE — TELEPHONE ENCOUNTER
Telephone number we have is not correct. Contacted the patient at the correct number in her chart and someone had already called her.

## 2018-08-29 DIAGNOSIS — R79.89 CREATININE ELEVATION: Primary | ICD-10-CM

## 2018-08-30 LAB
BUN SERPL-MCNC: 11 MG/DL (ref 6–24)
BUN/CREAT SERPL: 11 (ref 9–23)
CALCIUM SERPL-MCNC: 8.9 MG/DL (ref 8.7–10.2)
CHLORIDE SERPL-SCNC: 98 MMOL/L (ref 96–106)
CO2 SERPL-SCNC: 21 MMOL/L (ref 20–29)
CREAT SERPL-MCNC: 0.98 MG/DL (ref 0.57–1)
GLUCOSE SERPL-MCNC: 79 MG/DL (ref 65–99)
POTASSIUM SERPL-SCNC: 4.1 MMOL/L (ref 3.5–5.2)
SODIUM SERPL-SCNC: 133 MMOL/L (ref 134–144)

## 2018-09-12 ENCOUNTER — TELEPHONE (OUTPATIENT)
Dept: FAMILY MEDICINE CLINIC | Age: 48
End: 2018-09-12

## 2018-09-12 NOTE — TELEPHONE ENCOUNTER
Writer returned patient call, two patient verifiers used for verification of identity, Patient was advised of lab results, Patient had no further questions, Patient was also advised that labs do not need to be re drawn per MD.  Patient expressed understanding.

## 2019-05-18 ENCOUNTER — HOSPITAL ENCOUNTER (OUTPATIENT)
Dept: CT IMAGING | Age: 49
Discharge: HOME | End: 2019-05-18
Attending: INTERNAL MEDICINE
Payer: COMMERCIAL

## 2019-05-18 DIAGNOSIS — Q61.3 POLYCYSTIC KIDNEY: ICD-10-CM

## 2019-05-18 PROCEDURE — 74150 CT ABDOMEN W/O CONTRAST: CPT

## 2021-02-04 ENCOUNTER — OFFICE VISIT (OUTPATIENT)
Dept: HEMATOLOGY | Age: 51
End: 2021-02-04
Payer: COMMERCIAL

## 2021-02-04 VITALS
RESPIRATION RATE: 18 BRPM | HEIGHT: 68 IN | TEMPERATURE: 98.6 F | BODY MASS INDEX: 22.91 KG/M2 | WEIGHT: 151.2 LBS | SYSTOLIC BLOOD PRESSURE: 142 MMHG | OXYGEN SATURATION: 100 % | HEART RATE: 73 BPM | DIASTOLIC BLOOD PRESSURE: 77 MMHG

## 2021-02-04 DIAGNOSIS — Q44.6 POLYCYSTIC LIVER DISEASE: ICD-10-CM

## 2021-02-04 DIAGNOSIS — D50.0 IRON DEFICIENCY ANEMIA DUE TO CHRONIC BLOOD LOSS: Primary | ICD-10-CM

## 2021-02-04 PROBLEM — D50.9 IRON DEFICIENCY ANEMIA: Status: ACTIVE | Noted: 2021-02-04

## 2021-02-04 PROBLEM — Z98.890 H/O UMBILICAL HERNIA REPAIR: Status: ACTIVE | Noted: 2021-02-04

## 2021-02-04 PROBLEM — Z87.19 H/O UMBILICAL HERNIA REPAIR: Status: ACTIVE | Noted: 2021-02-04

## 2021-02-04 PROBLEM — Z98.890 H/O INGUINAL HERNIA REPAIR: Status: ACTIVE | Noted: 2021-02-04

## 2021-02-04 PROBLEM — Z87.19 H/O INGUINAL HERNIA REPAIR: Status: ACTIVE | Noted: 2021-02-04

## 2021-02-04 PROCEDURE — 99203 OFFICE O/P NEW LOW 30 MIN: CPT | Performed by: INTERNAL MEDICINE

## 2021-02-04 NOTE — PROGRESS NOTES
Identified pt with two pt identifiers(name and ). Reviewed record in preparation for visit and have obtained necessary documentation. Chief Complaint   Patient presents with    New Patient    Cyst     Liver Cysts      Vitals:    21 0947   BP: (!) 142/77   Pulse: 73   Resp: 18   Temp: 98.6 °F (37 °C)   TempSrc: Temporal   SpO2: 100%   Weight: 151 lb 3.2 oz (68.6 kg)   Height: 5' 8\" (1.727 m)   PainSc:   0 - No pain       Health Maintenance Review: Patient reminded of \"due or due soon\" health maintenance. I have asked the patient to contact his/her primary care provider (PCP) for follow-up on his/her health maintenance. Coordination of Care Questionnaire:  :   1) Have you been to an emergency room, urgent care, or hospitalized since your last visit? If yes, where when, and reason for visit? no       2. Have seen or consulted any other health care provider since your last visit? If yes, where when, and reason for visit? YES    Nephrologist  Liver Surgeon; Dr. Skyla Roman    Patient is accompanied by self I have received verbal consent from Shelba Trera to discuss any/all medical information while they are present in the room.

## 2021-02-04 NOTE — PROGRESS NOTES
Atif Peace MD, [address], [place], Issa Vasques MD, MPH      Dara Burgess, PA-C    Francisco J Tian, [facility]-BC     April S Colleen, [facility]   Eboni Johnsonn, FNP-C    Elizabetharmando Boss, [facility]       Mary Deputado [address]    at [facility]    [address] [facility] [place], [address] [facility], [facility] [address]    [phone]    FAX: [address] [place] [address], [address], [address]    [phone]    FAX: [phone]         Patient Care Team:  Tyree Burks MD as PCP - General (Family Medicine)  Tyree Burks MD as PCP - [facility]  Lamin Nuñez MD (Obstetrics & Gynecology)      Problem List  Date Reviewed: 2/4/2021          Codes Class Noted    Polycystic liver disease ICD-10-CM: Q44.6  ICD-9-CM: 751.62  2/4/2021        H/O inguinal hernia repair ICD-10-CM: Z98.890, Z87.19  ICD-9-CM: V15.29  2/4/8565        H/O umbilical hernia repair XTE-26-MU: Z98.890, Z87.19  ICD-9-CM: V15.29  2/4/2021        Meningioma ([address] ICD-10-CM: D32.9  ICD-9-CM: 225.2  4/6/2018        PFO (patent foramen ovale) ICD-10-CM: Q21.1  ICD-9-CM: 745.5  4/6/2018        Migraine ICD-10-CM: S18.800  ICD-9-CM: 346.90  4/6/2018        Essential hypertension ICD-10-CM: I10  ICD-9-CM: 401.9  4/6/2018        Fibroid, uterine ICD-10-CM: D25.9  ICD-9-CM: 218.9  Unknown        Polycystic kidney disease ICD-10-CM: Q61.3  ICD-9-CM: 753.12  Unknown                The clinicians listed above have asked me to see Nolan Watkins in consultation regarding polycystic liver disease. All medical records sent by the referring physicians were reviewed including imaging studies     The patient is a 44 y.o.  Black female Who was found to have polycystic kidney disease in 2013 and then found to have polycystic liver. The most recent CT scan was performed in 5/2019. This demonstrated polycystic liver disease with numerous cysts of various sizes. The patient  has the following symptoms which are thought to be due to the liver disease:  Fatigue, Epigastric and left sided abdominal pain, abdominal distention,     The patient is not currently experiencing the following symptoms of liver disease:  Early satiety,     The patient completes all daily activities without any functional limitations. ASSESSMENT AND PLAN:  Polycystic liver disease   The patient has innumerable cysts of varying sizes throughout out the entire liver. Have performed laboratory testing to monitor liver function and degree of liver injury. This included BMP, hepatic panel, CBC with platelet count, INR. Liver transaminases are normal.  ALP is normal.  Liver function is normal.  The platelet count is normal.      Serologic testing for causes of chronic liver disease was ordered. All was negative     Patients with polycystic liver disease never develop liver dysfunction and the cysts do not lead to liver failure or cirrhosis. Patients can develop massive hepatomegaly from all the cysts which can be both disfiguring and uncomfortable from severe abdominal distention. Surgical resection of the cysts is not possible since they are throughout the liver and if only a part of the liver is resected it will regrow with cysts. Routine scanning of the liver to assess for a change in size of the cysts is not indicated or helpful in the management. The patient is interested in participating in a clinical trial for patients with polycystic liver disease utilizing a substance that decreases cyst volume. Screening for Hepatocellular Carcinoma  HCC screening is not necessary if the patient has no evidence of cirrhosis.     Treatment of other medical problems in patients with chronic liver disease  There are no contraindications for the patient to take most medications that are necessary for treatment of other medical issues. Counseling for alcohol in patients with chronic liver disease  The patient was counseled regarding alcohol consumption and the effect of alcohol on chronic liver disease. The patient does not consume any significant amount of alcohol. Vaccinations   Vaccination for viral hepatitis A and B is recommended since the patient has no serologic evidence of previous exposure or vaccination with immunity. Routine vaccinations against other bacterial and viral agents can be performed as indicated. Annual flu vaccination should be administered if indicated. ALLERGIES  Allergies   Allergen Reactions    Sulfa (Sulfonamide Antibiotics) Rash     Stated that she breaks out on a rash        MEDICATIONS  Current Outpatient Medications   Medication Sig    multivit with minerals/lutein (MULTIVITAMIN 50 PLUS PO) multivitamin    OTHER Patient states takes an Herbal Immune Support    lisinopril (PRINIVIL, ZESTRIL) 5 mg tablet Take 1 Tab by mouth daily.  cholecalciferol, vitamin D3, (VITAMIN D3) 2,000 unit tab Take  by mouth every other day.  FERROUS FUMARATE (IRON PO) Take  by mouth every other day. No current facility-administered medications for this visit. SYSTEM REVIEW NOT RELATED TO LIVER DISEASE OR REVIEWED ABOVE:  Constitution systems: Negative for fever, chills, weight gain, weight loss. Eyes: Negative for visual changes. ENT: Negative for sore throat, painful swallowing. Respiratory: Negative for cough, hemoptysis, SOB. Cardiology: Negative for chest pain, palpitations. GI:  Negative for constipation or diarrhea. : Negative for urinary frequency, dysuria, hematuria, nocturia. Skin: Negative for rash. Hematology: Negative for easy bruising, blood clots. Musculo-skelatal: Negative for back pain, muscle pain, weakness.   Neurologic: Negative for headaches, dizziness, vertigo, memory problems not related to HE. Psychology: Negative for anxiety, depression. FAMILY HISTORY:  The patient has no knowledge of the father's medical condition. The mother  ESRD from polycystic kidney, of lung cancer, . SOCIAL HISTORY:  The patient is . The patient has 2 children,   The patient has never used tobacco products. The patient has never consumed significant amounts of alcohol. 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 PHYSICAL EXAMINATION:  Visit Vitals  BP (!) 142/77 (BP 1 Location: Right upper arm, BP Patient Position: Sitting, BP Cuff Size: Adult)   Pulse 73   Temp 98.6 °F (37 °C) (Temporal)   Resp 18   Ht 5' 8\" (1.727 m)   Wt 151 lb 3.2 oz (68.6 kg)   SpO2 100%   BMI 22.99 kg/m²       General: No acute distress. Eyes: Sclera anicteric. ENT: No oral lesions. Thyroid normal.  Nodes: No adenopathy. Skin: No spider angiomata. No jaundice. No palmar erythema. Respiratory: Lungs clear to auscultation. Cardiovascular: Regular heart rate. No murmurs. No JVD. Abdomen: Soft non-tender, liver size normal to percussion/palpation. Spleen not palpable. No obvious ascites. Extremities: No edema. No muscle wasting. No gross arthritic changes. Neurologic: Alert and oriented. Cranial nerves grossly intact. No asterixis. PHYSICAL EXAMINATION:  Visit Vitals  BP (!) 142/77 (BP 1 Location: Right upper arm, BP Patient Position: Sitting, BP Cuff Size: Adult)   Pulse 73   Temp 98.6 °F (37 °C) (Temporal)   Resp 18   Ht 5' 8\" (1.727 m)   Wt 151 lb 3.2 oz (68.6 kg)   SpO2 100%   BMI 22.99 kg/m²     General: No acute distress. Eyes: Sclera anicteric. ENT: No oral lesions. Thyroid normal.  Nodes: No adenopathy. Skin: No spider angiomata. No jaundice. No palmar erythema. Respiratory: Lungs clear to auscultation. Cardiovascular: Regular heart rate.   No murmurs. No JVD. Abdomen: Soft non-tender. Liver size normal to percussion/palpation. Spleen not palpable. No obvious ascites. Extremities: No edema. No muscle wasting. No gross arthritic changes. Neurologic: Alert and oriented. Cranial nerves grossly intact. No asterixis. LABORATORY STUDIES:  Liver [place] of [address] Units 2/4/2021   WBC 3.6 - 11.0 K/uL 4.1   ANC 1.8 - 8.0 K/UL 2.4   HGB 11.5 - 16.0 g/dL 11.9    - 400 K/uL 278   INR 0.9 - 1.1      AST 15 - 37 U/L 14 (L)   ALT 12 - 78 U/L 23   Alk Phos 45 - 117 U/L 62   Bili, Total 0.2 - 1.0 MG/DL 0.4   Bili, Direct 0.0 - 0.2 MG/DL 0.2   Albumin 3.5 - 5.0 g/dL 3.8   BUN 6 - 20 MG/DL 14   Creat 0.55 - 1.02 MG/DL 1.03 (H)   Na 136 - 145 mmol/L 138   K 3.5 - 5.1 mmol/L 4.2   Cl 97 - 108 mmol/L 109 (H)   CO2 21 - 32 mmol/L 25   Glucose 65 - 100 mg/dL 75     SEROLOGIES:  Serologies Latest Ref Rng & Units 2/4/2021   Hep A Ab, Total Negative   Negative   Hep B Surface Ag Index <0.10   Hep B Surface Ag Interp Negative   Negative   Hep B Core Ab, Total Negative   Negative   Hep B Surface Ab mIU/mL 4.65   Hep B Surface Ab Interp NONREACTIVE   NONREACTIVE   Hep C Ab 0.0 - 0.9 s/co ratio <0.1   Ferritin 8 - 252 NG/ML 15   Iron % Saturation 20 - 50 % 7 (L)   Ceruloplasmin 19.0 - 39.0 mg/dL 30.3     LIVER HISTOLOGY:  Not available or performed    ENDOSCOPIC PROCEDURES:  Not available or performed    RADIOLOGY:  5/2019. CT scan abdomen without IV contrast.  Innumerable liver cysts consistent with polycystic liver disease. OTHER TESTING:  Not available or performed    FOLLOW-UP:  All of the issues listed above in the Assessment and Plan were discussed with the patient. All questions were answered. The patient expressed a clear understanding of the above. [address] [address] in 12 months for routine monitoring.       Destinee Ramirez MD  [address] [place], [address]  [address] [facility]

## 2021-02-06 LAB
ALBUMIN SERPL-MCNC: 3.8 G/DL (ref 3.5–5)
ALBUMIN/GLOB SERPL: 1.2 {RATIO} (ref 1.1–2.2)
ALP SERPL-CCNC: 62 U/L (ref 45–117)
ALT SERPL-CCNC: 23 U/L (ref 12–78)
ANION GAP SERPL CALC-SCNC: 4 MMOL/L (ref 5–15)
AST SERPL-CCNC: 14 U/L (ref 15–37)
BASOPHILS # BLD: 0 K/UL (ref 0–0.1)
BASOPHILS NFR BLD: 1 % (ref 0–1)
BILIRUB DIRECT SERPL-MCNC: 0.2 MG/DL (ref 0–0.2)
BILIRUB SERPL-MCNC: 0.4 MG/DL (ref 0.2–1)
BUN SERPL-MCNC: 14 MG/DL (ref 6–20)
BUN/CREAT SERPL: 14 (ref 12–20)
CALCIUM SERPL-MCNC: 9 MG/DL (ref 8.5–10.1)
CERULOPLASMIN SERPL-MCNC: 30.3 MG/DL (ref 19–39)
CHLORIDE SERPL-SCNC: 109 MMOL/L (ref 97–108)
CO2 SERPL-SCNC: 25 MMOL/L (ref 21–32)
CREAT SERPL-MCNC: 1.03 MG/DL (ref 0.55–1.02)
DIFFERENTIAL METHOD BLD: ABNORMAL
EOSINOPHIL # BLD: 0.1 K/UL (ref 0–0.4)
EOSINOPHIL NFR BLD: 2 % (ref 0–7)
ERYTHROCYTE [DISTWIDTH] IN BLOOD BY AUTOMATED COUNT: 14.3 % (ref 11.5–14.5)
FERRITIN SERPL-MCNC: 15 NG/ML (ref 8–252)
GLOBULIN SER CALC-MCNC: 3.2 G/DL (ref 2–4)
GLUCOSE SERPL-MCNC: 75 MG/DL (ref 65–100)
HAV AB SER QL IA: NEGATIVE
HBV CORE AB SERPL QL IA: NEGATIVE
HBV SURFACE AB SER QL: NONREACTIVE
HBV SURFACE AB SER-ACNC: 4.65 MIU/ML
HBV SURFACE AG SER QL: <0.1 INDEX
HBV SURFACE AG SER QL: NEGATIVE
HCT VFR BLD AUTO: 38.1 % (ref 35–47)
HCV AB S/CO SERPL IA: <0.1 S/CO RATIO (ref 0–0.9)
HCV AB SERPL QL IA: NORMAL
HGB BLD-MCNC: 11.9 G/DL (ref 11.5–16)
IMM GRANULOCYTES # BLD AUTO: 0 K/UL (ref 0–0.04)
IMM GRANULOCYTES NFR BLD AUTO: 0 % (ref 0–0.5)
IRON SATN MFR SERPL: 7 % (ref 20–50)
IRON SERPL-MCNC: 25 UG/DL (ref 35–150)
LYMPHOCYTES # BLD: 1.4 K/UL (ref 0.8–3.5)
LYMPHOCYTES NFR BLD: 33 % (ref 12–49)
MCH RBC QN AUTO: 25.3 PG (ref 26–34)
MCHC RBC AUTO-ENTMCNC: 31.2 G/DL (ref 30–36.5)
MCV RBC AUTO: 81.1 FL (ref 80–99)
MONOCYTES # BLD: 0.2 K/UL (ref 0–1)
MONOCYTES NFR BLD: 5 % (ref 5–13)
NEUTS SEG # BLD: 2.4 K/UL (ref 1.8–8)
NEUTS SEG NFR BLD: 59 % (ref 32–75)
NRBC # BLD: 0 K/UL (ref 0–0.01)
NRBC BLD-RTO: 0 PER 100 WBC
PLATELET # BLD AUTO: 278 K/UL (ref 150–400)
PMV BLD AUTO: 10.4 FL (ref 8.9–12.9)
POTASSIUM SERPL-SCNC: 4.2 MMOL/L (ref 3.5–5.1)
PROT SERPL-MCNC: 7 G/DL (ref 6.4–8.2)
RBC # BLD AUTO: 4.7 M/UL (ref 3.8–5.2)
SODIUM SERPL-SCNC: 138 MMOL/L (ref 136–145)
TIBC SERPL-MCNC: 368 UG/DL (ref 250–450)
WBC # BLD AUTO: 4.1 K/UL (ref 3.6–11)

## 2022-03-18 PROBLEM — I10 ESSENTIAL HYPERTENSION: Status: ACTIVE | Noted: 2018-04-06

## 2022-03-18 PROBLEM — Q44.6 POLYCYSTIC LIVER DISEASE: Status: ACTIVE | Noted: 2021-02-04

## 2022-03-19 PROBLEM — Z87.19 H/O UMBILICAL HERNIA REPAIR: Status: ACTIVE | Noted: 2021-02-04

## 2022-03-19 PROBLEM — Z98.890 H/O UMBILICAL HERNIA REPAIR: Status: ACTIVE | Noted: 2021-02-04

## 2022-03-19 PROBLEM — G43.909 MIGRAINE: Status: ACTIVE | Noted: 2018-04-06

## 2022-03-19 PROBLEM — Z98.890 H/O INGUINAL HERNIA REPAIR: Status: ACTIVE | Noted: 2021-02-04

## 2022-03-19 PROBLEM — Z87.19 H/O INGUINAL HERNIA REPAIR: Status: ACTIVE | Noted: 2021-02-04

## 2022-03-19 PROBLEM — D50.9 IRON DEFICIENCY ANEMIA: Status: ACTIVE | Noted: 2021-02-04

## 2022-03-19 PROBLEM — D32.9 MENINGIOMA (HCC): Status: ACTIVE | Noted: 2018-04-06

## 2022-03-19 PROBLEM — Q21.12 PFO (PATENT FORAMEN OVALE): Status: ACTIVE | Noted: 2018-04-06

## 2023-01-26 ENCOUNTER — TELEPHONE (OUTPATIENT)
Dept: HEMATOLOGY | Age: 53
End: 2023-01-26

## 2023-02-28 ENCOUNTER — DOCUMENTATION ONLY (OUTPATIENT)
Dept: HEMATOLOGY | Age: 53
End: 2023-02-28

## 2023-02-28 NOTE — PROGRESS NOTES
Records received from [facility] and per updated 2/13/23 office protocal, do not meet criteria for further review, schedule next available.

## 2023-04-05 ENCOUNTER — HOSPITAL ENCOUNTER (OUTPATIENT)
Age: 53
End: 2023-04-05
Attending: EMERGENCY MEDICINE
Payer: COMMERCIAL

## 2023-04-05 LAB
ALBUMIN SERPL-MCNC: 3.4 G/DL (ref 3.5–5)
ALBUMIN/GLOB SERPL: 0.9 (ref 1.1–2.2)
ALP SERPL-CCNC: 63 U/L (ref 45–117)
ALT SERPL-CCNC: 23 U/L (ref 12–78)
ANION GAP SERPL CALC-SCNC: 2 MMOL/L (ref 5–15)
AST SERPL-CCNC: 20 U/L (ref 15–37)
ATRIAL RATE: 73 BPM
BASOPHILS # BLD: 0 K/UL (ref 0–0.1)
BASOPHILS NFR BLD: 0 % (ref 0–1)
BILIRUB SERPL-MCNC: 0.5 MG/DL (ref 0.2–1)
BUN SERPL-MCNC: 11 MG/DL (ref 6–20)
BUN/CREAT SERPL: 10 (ref 12–20)
CALCIUM SERPL-MCNC: 8.7 MG/DL (ref 8.5–10.1)
CALCULATED P AXIS, ECG09: 22 DEGREES
CALCULATED R AXIS, ECG10: -2 DEGREES
CALCULATED T AXIS, ECG11: 17 DEGREES
CHLORIDE SERPL-SCNC: 105 MMOL/L (ref 97–108)
CO2 SERPL-SCNC: 26 MMOL/L (ref 21–32)
COMMENT, HOLDF: NORMAL
CREAT SERPL-MCNC: 1.09 MG/DL (ref 0.55–1.02)
DIAGNOSIS, 93000: NORMAL
DIFFERENTIAL METHOD BLD: ABNORMAL
EOSINOPHIL # BLD: 0 K/UL (ref 0–0.4)
EOSINOPHIL NFR BLD: 1 % (ref 0–7)
ERYTHROCYTE [DISTWIDTH] IN BLOOD BY AUTOMATED COUNT: 15.3 % (ref 11.5–14.5)
GLOBULIN SER CALC-MCNC: 3.8 G/DL (ref 2–4)
GLUCOSE SERPL-MCNC: 79 MG/DL (ref 65–100)
HCT VFR BLD AUTO: 34.5 % (ref 35–47)
HGB BLD-MCNC: 10.5 G/DL (ref 11.5–16)
IMM GRANULOCYTES # BLD AUTO: 0 K/UL
IMM GRANULOCYTES NFR BLD AUTO: 0 %
LYMPHOCYTES # BLD: 1.7 K/UL (ref 0.8–3.5)
LYMPHOCYTES NFR BLD: 40 % (ref 12–49)
MAGNESIUM SERPL-MCNC: 1.9 MG/DL (ref 1.6–2.4)
MCH RBC QN AUTO: 24.1 PG (ref 26–34)
MCHC RBC AUTO-ENTMCNC: 30.4 G/DL (ref 30–36.5)
MCV RBC AUTO: 79.3 FL (ref 80–99)
MONOCYTES # BLD: 0.2 K/UL (ref 0–1)
MONOCYTES NFR BLD: 4 % (ref 5–13)
NEUTS SEG # BLD: 2.3 K/UL (ref 1.8–8)
NEUTS SEG NFR BLD: 55 % (ref 32–75)
NRBC # BLD: 0 K/UL (ref 0–0.01)
NRBC BLD-RTO: 0 PER 100 WBC
P-R INTERVAL, ECG05: 156 MS
PLATELET # BLD AUTO: 263 K/UL (ref 150–400)
PMV BLD AUTO: 9.6 FL (ref 8.9–12.9)
POTASSIUM SERPL-SCNC: 3.9 MMOL/L (ref 3.5–5.1)
PROT SERPL-MCNC: 7.2 G/DL (ref 6.4–8.2)
Q-T INTERVAL, ECG07: 368 MS
QRS DURATION, ECG06: 68 MS
QTC CALCULATION (BEZET), ECG08: 405 MS
RBC # BLD AUTO: 4.35 M/UL (ref 3.8–5.2)
RBC MORPH BLD: ABNORMAL
RBC MORPH BLD: ABNORMAL
SAMPLES BEING HELD,HOLD: NORMAL
SODIUM SERPL-SCNC: 133 MMOL/L (ref 136–145)
TROPONIN I SERPL HS-MCNC: <3 NG/L (ref 0–37)
TSH SERPL DL<=0.05 MIU/L-ACNC: 2.75 UIU/ML (ref 0.36–3.74)
VENTRICULAR RATE, ECG03: 73 BPM
WBC # BLD AUTO: 4.2 K/UL (ref 3.6–11)

## 2023-04-05 PROCEDURE — 93005 ELECTROCARDIOGRAM TRACING: CPT

## 2023-04-05 PROCEDURE — 99284 EMERGENCY DEPT VISIT MOD MDM: CPT

## 2023-04-05 PROCEDURE — 84443 ASSAY THYROID STIM HORMONE: CPT

## 2023-04-05 PROCEDURE — 36415 COLL VENOUS BLD VENIPUNCTURE: CPT

## 2023-04-05 PROCEDURE — 80053 COMPREHEN METABOLIC PANEL: CPT

## 2023-04-05 PROCEDURE — 84484 ASSAY OF TROPONIN QUANT: CPT

## 2023-04-05 PROCEDURE — 83735 ASSAY OF MAGNESIUM: CPT

## 2023-04-05 PROCEDURE — 85025 COMPLETE CBC W/AUTO DIFF WBC: CPT

## 2023-04-05 NOTE — ED PROVIDER NOTES
Please note that this dictation was completed with [email or web address], the computer voice recognition software. Quite often unanticipated grammatical, syntax, homophones, and other interpretive errors are inadvertently transcribed by the computer software. Please disregard these errors. Please excuse any errors that have escaped final proofreading. Patient is a 49-year-old female with history of polycystic kidney disease, fibroids, PFO, presenting to ED for evaluation of palpitations with onset yesterday. She states that she was feeling an intermittent brief flutter throughout the day. States that since arriving at the ED her symptoms have resolved. States that 30 years ago she was having similar symptoms and was diagnosed with an \"irregular heartbeat\" by cardiologist, she cannot remember the name of the rhythm she was diagnosed with. She notes that she typically gets intermittent flutterings but none that have lasted this long. She has an appointment scheduled with her PCP tomorrow to discuss her blood pressure and any symptoms. She denies headache, dizziness, chest pain, shortness of breath, abdominal pain, leg swelling, or any additional medical complaints at this time. Denies caffeine use.        Past Medical History:   Diagnosis Date    Anemia     Fibroid, uterine     Meningioma ([facility] [address] 4/6/2018    PFO (patent foramen ovale) 4/6/2018    Polycystic kidney disease        Past Surgical History:   Procedure Laterality Date    HX HERNIA REPAIR Left ~1988    inguinal     HX HERNIA REPAIR  ~9588    umbilical     HX OTHER SURGICAL Right as a child/teen    benign tumor removal X2         Family History:   Problem Relation Age of Onset    Hypertension Mother     Kidney Disease Mother         transplant    Cancer Mother         lung    No Known Problems Father     Heart Disease Maternal Grandmother     Heart Attack Maternal Grandmother     Diabetes Maternal Grandmother     Other Maternal Grandmother         DR or glaucoma or both    Kidney Disease Maternal Grandfather     No Known Problems Paternal Grandmother     No Known Problems Paternal Grandfather     Cancer Other     Diabetes Other     Heart Disease Other     Kidney Disease Other     Glaucoma Other     Cataract Other     Seizures Daughter         childhood    Asthma Daughter         childhood    Attention Deficit Hyperactivity Disorder Daughter     Asthma Daughter         childhood    Other Daughter         umbilical hernia repair       Social History     Socioeconomic History    Marital status:      Spouse name: Gina Busby    Number of children: 2    Years of education: Not on file    Highest education level: Not on file   Occupational History    Occupation: [employer]   Tobacco Use    Smoking status: Never    Smokeless tobacco: Never   Substance and Sexual Activity    Alcohol use: No    Drug use: No    Sexual activity: Yes     Comment: monogamous   Other Topics Concern    Not on file   Social History Narrative    Lives with spouse and dtrs/dtr are in college. [address]     Social Determinants of Health     Financial Resource Strain: Not on file   Food Insecurity: Not on file   Transportation Needs: Not on file   Physical Activity: Not on file   Stress: Not on file   Social Connections: Not on file   Intimate Partner Violence: Not on file   Housing Stability: Not on file         ALLERGIES: Sulfa (sulfonamide antibiotics)    Review of Systems   Constitutional:  Negative for fever. HENT:  Negative for congestion and sore throat. Eyes:  Negative for visual disturbance. Respiratory:  Negative for cough and shortness of breath. Cardiovascular:  Positive for palpitations. Negative for chest pain. Gastrointestinal:  Negative for abdominal pain, diarrhea, nausea and vomiting. Genitourinary:  Negative for dysuria. Musculoskeletal:  Negative for back pain. Skin:  Negative for color change. Neurological:  Negative for dizziness and headaches. Psychiatric/Behavioral:  Negative for confusion. Vitals:    04/05/23 1330   BP: (!) 164/91   Pulse: 79   Resp: 16   Temp: 97.6 °F (36.4 °C)   SpO2: 99%            Physical Exam  Vitals and nursing note reviewed. Constitutional:       General: She is not in acute distress. Appearance: Normal appearance. She is not ill-appearing. HENT:      Head: Normocephalic and atraumatic. Eyes:      General: Vision grossly intact. Extraocular Movements: Extraocular movements intact. Conjunctiva/sclera: Conjunctivae normal.   Neck:      Trachea: Phonation normal.   Cardiovascular:      Rate and Rhythm: Normal rate and regular rhythm. Heart sounds: Normal heart sounds. Pulmonary:      Effort: Pulmonary effort is normal.      Breath sounds: Normal breath sounds. Abdominal:      Palpations: Abdomen is soft. Tenderness: There is no abdominal tenderness. Musculoskeletal:         General: Normal range of motion. Cervical back: Normal range of motion. Skin:     General: Skin is warm and dry. Neurological:      General: No focal deficit present. Mental Status: She is alert and oriented to person, place, and time. Medical Decision Making  This is a 49-year-old female who presents to the emergency room ambulatory with slightly elevated blood pressure, otherwise stable vitals signs with complaints of intermittent palpitations. I personally reviewed any available previous notes and chronic medical issues  Differential Diagnoses: Cardiac arrhythmia, PVC, anxiety, electrolyte imbalance, dehydration, other  On physical exam she is well-appearing. Currently asymptomatic. Heart, lung exam within normal limits. I ordered and interpreted the following tests: EKG without acute ischemic changes or ectopy. Labs show stable chronic anemia, slight elevation in creatinine, otherwise reassuring. TSH within normal limits.   Interventions and Plan: Patient reevaluated, continues to be asymptomatic throughout her ED stay. Will recommend follow-up with her PCP for monitoring of symptoms and her blood pressure as well as referral for cardiology to discuss Holter monitor if symptoms persist.  Return precautions outlined. All questions answered at this time. Amount and/or Complexity of Data Reviewed  Labs: ordered. Decision-making details documented in ED Course. ECG/medicine tests: ordered. ED Course as of 04/05/23 1603   Wed Apr 05, 2023   1531 Troponin-High Sensitivity: <3 [EP]   1531 Magnesium: 1.9 [EP]   1531 TSH: 2.75 [EP]   9967 METABOLIC PANEL, COMPREHENSIVE(!):    Sodium 133(!)   Potassium 3.9   Chloride 105   CO2 26   Anion gap 2(!)   Glucose 79   BUN 11   Creatinine 1.09(!)   BUN/Creatinine ratio 10(!)   eGFR >60   Calcium 8.7   Bilirubin, total 0.5   ALT 23   AST 20   Alk. phosphatase 63   Protein, total 7.2   Albumin 3.4(!)   Globulin 3.8   A-G Ratio 0.9(!) [EP]   8117 ED EKG interpretation:3:32 PM  Rhythm: normal sinus rhythm; and regular. Rate (approx.): 73; Axis: normal; P wave: normal; ST/T wave: no concerning ST elevations or depressions; Other findings: unremarkable. EKG has also been evaluated by attending ED physician. YESSI Tuttle   [EP]      ED Course User Index  [EP] YESSI Evans   4:05 PM  Pt has been reevaluated. There are no new complaints, changes, or physical findings at this time. All results have been reviewed with patient and/or family. Medications have been reviewed w/ pt and/or family. Pt and/or family's questions have been answered. Pt and/or family expressed good understanding of the dx/tx/rx and is in agreement with plan of care. Pt instructed and agreed to f/u w/ cardiology and to return to ED upon further deterioration. Return precautions outlined. All questions answered at this time. Pt is stable and ready for discharge. IMPRESSION:  1. Palpitations        PLAN:  1. Current Discharge Medication List        2.    Follow-up Information       Follow up With Specialties Details Why Contact Info    Montez Gaines MD Cardiovascular Disease Physician Schedule an appointment as soon as possible for a visit   [address] [facility]      Yaya Lainez MD Family Medicine Go in 1 day As scheduled [address] [place] [address]                 Return to ED if worse       Procedures

## 2023-06-29 NOTE — ROUTINE PROCESS
TRANSFER - OUT REPORT:    Verbal report given to Alice(name) on [facility]  being transferred to [facility](unit) for routine progression of care       Report consisted of patients Situation, Background, Assessment and   Recommendations(SBAR). Information from the following report(s) SBAR and Kardex was reviewed with the receiving nurse. Lines:   Peripheral IV 04/03/18 Right Antecubital (Active)   Site Assessment Clean, dry, & intact 4/3/2018  5:30 PM   Phlebitis Assessment 0 4/3/2018  5:30 PM   Infiltration Assessment 0 4/3/2018  5:30 PM   Dressing Status Clean, dry, & intact 4/3/2018  5:30 PM   Hub Color/Line Status Pink 4/3/2018  5:30 PM        Opportunity for questions and clarification was provided.       Patient transported with:   [employer] [facility]: 6

## 2023-11-02 PROBLEM — D63.1 ERYTHROPOIETIN DEFICIENCY ANEMIA: Status: ACTIVE | Noted: 2023-11-02

## 2023-11-02 RX ORDER — SODIUM CHLORIDE 9 MG/ML
INJECTION, SOLUTION INTRAVENOUS CONTINUOUS
OUTPATIENT
Start: 2023-11-09

## 2023-11-02 RX ORDER — EPINEPHRINE 1 MG/ML
0.3 INJECTION, SOLUTION, CONCENTRATE INTRAVENOUS PRN
OUTPATIENT
Start: 2023-11-09

## 2023-11-02 RX ORDER — ONDANSETRON 2 MG/ML
8 INJECTION INTRAMUSCULAR; INTRAVENOUS
OUTPATIENT
Start: 2023-11-09

## 2023-11-02 RX ORDER — HEPARIN 100 UNIT/ML
500 SYRINGE INTRAVENOUS PRN
OUTPATIENT
Start: 2023-11-09

## 2023-11-02 RX ORDER — SODIUM CHLORIDE 0.9 % (FLUSH) 0.9 %
5-40 SYRINGE (ML) INJECTION PRN
OUTPATIENT
Start: 2023-11-09

## 2023-11-02 RX ORDER — SODIUM CHLORIDE 9 MG/ML
5-250 INJECTION, SOLUTION INTRAVENOUS PRN
OUTPATIENT
Start: 2023-11-09

## 2023-11-02 RX ORDER — ALBUTEROL SULFATE 90 UG/1
4 AEROSOL, METERED RESPIRATORY (INHALATION) PRN
OUTPATIENT
Start: 2023-11-09

## 2023-11-09 ENCOUNTER — HOSPITAL ENCOUNTER (OUTPATIENT)
Facility: HOSPITAL | Age: 53
Setting detail: INFUSION SERIES
Discharge: HOME | End: 2023-11-09
Payer: COMMERCIAL

## 2023-11-09 VITALS
DIASTOLIC BLOOD PRESSURE: 83 MMHG | SYSTOLIC BLOOD PRESSURE: 132 MMHG | HEART RATE: 78 BPM | RESPIRATION RATE: 18 BRPM | TEMPERATURE: 97.4 F

## 2023-11-09 DIAGNOSIS — D63.1 ERYTHROPOIETIN DEFICIENCY ANEMIA: Primary | ICD-10-CM

## 2023-11-09 PROCEDURE — 96374 THER/PROPH/DIAG INJ IV PUSH: CPT

## 2023-11-09 PROCEDURE — 6360000002 HC RX W HCPCS: Performed by: INTERNAL MEDICINE

## 2023-11-09 RX ORDER — EPINEPHRINE 1 MG/ML
0.3 INJECTION, SOLUTION, CONCENTRATE INTRAVENOUS PRN
OUTPATIENT
Start: 2023-11-16

## 2023-11-09 RX ORDER — SODIUM CHLORIDE 9 MG/ML
INJECTION, SOLUTION INTRAVENOUS CONTINUOUS
OUTPATIENT
Start: 2023-11-16

## 2023-11-09 RX ORDER — ONDANSETRON 2 MG/ML
8 INJECTION INTRAMUSCULAR; INTRAVENOUS
OUTPATIENT
Start: 2023-11-16

## 2023-11-09 RX ORDER — SODIUM CHLORIDE 9 MG/ML
5-250 INJECTION, SOLUTION INTRAVENOUS PRN
OUTPATIENT
Start: 2023-11-16

## 2023-11-09 RX ORDER — SODIUM CHLORIDE 9 MG/ML
5-250 INJECTION, SOLUTION INTRAVENOUS PRN
Status: DISCONTINUED | OUTPATIENT
Start: 2023-11-09 | End: 2023-11-10 | Stop reason: HOSPADM

## 2023-11-09 RX ORDER — ACETAMINOPHEN 325 MG/1
650 TABLET ORAL
Status: DISCONTINUED | OUTPATIENT
Start: 2023-11-09 | End: 2023-11-10 | Stop reason: HOSPADM

## 2023-11-09 RX ORDER — ACETAMINOPHEN 325 MG/1
650 TABLET ORAL
OUTPATIENT
Start: 2023-11-16

## 2023-11-09 RX ORDER — SODIUM CHLORIDE 0.9 % (FLUSH) 0.9 %
5-40 SYRINGE (ML) INJECTION PRN
OUTPATIENT
Start: 2023-11-16

## 2023-11-09 RX ORDER — DIPHENHYDRAMINE HYDROCHLORIDE 50 MG/ML
50 INJECTION INTRAMUSCULAR; INTRAVENOUS
OUTPATIENT
Start: 2023-11-16

## 2023-11-09 RX ORDER — ALBUTEROL SULFATE 90 UG/1
4 AEROSOL, METERED RESPIRATORY (INHALATION) PRN
OUTPATIENT
Start: 2023-11-16

## 2023-11-09 RX ORDER — DIPHENHYDRAMINE HYDROCHLORIDE 50 MG/ML
50 INJECTION INTRAMUSCULAR; INTRAVENOUS
Status: DISCONTINUED | OUTPATIENT
Start: 2023-11-09 | End: 2023-11-10 | Stop reason: HOSPADM

## 2023-11-09 RX ORDER — HEPARIN 100 UNIT/ML
500 SYRINGE INTRAVENOUS PRN
OUTPATIENT
Start: 2023-11-16

## 2023-11-09 RX ADMIN — IRON SUCROSE 200 MG: 20 INJECTION, SOLUTION INTRAVENOUS at 13:15

## 2023-11-16 ENCOUNTER — HOSPITAL ENCOUNTER (OUTPATIENT)
Facility: HOSPITAL | Age: 53
Setting detail: INFUSION SERIES
End: 2023-11-16

## 2023-11-24 ENCOUNTER — APPOINTMENT (OUTPATIENT)
Facility: HOSPITAL | Age: 53
End: 2023-11-24
Payer: COMMERCIAL

## 2023-12-01 ENCOUNTER — APPOINTMENT (OUTPATIENT)
Facility: HOSPITAL | Age: 53
End: 2023-12-01
Payer: COMMERCIAL

## 2023-12-15 ENCOUNTER — HOSPITAL ENCOUNTER (OUTPATIENT)
Facility: HOSPITAL | Age: 53
Setting detail: INFUSION SERIES
Discharge: HOME | End: 2023-12-15
Payer: COMMERCIAL

## 2023-12-15 VITALS — DIASTOLIC BLOOD PRESSURE: 82 MMHG | HEART RATE: 70 BPM | SYSTOLIC BLOOD PRESSURE: 135 MMHG | TEMPERATURE: 97.6 F

## 2023-12-15 DIAGNOSIS — D63.1 ERYTHROPOIETIN DEFICIENCY ANEMIA: Primary | ICD-10-CM

## 2023-12-15 PROCEDURE — 6360000002 HC RX W HCPCS: Performed by: INTERNAL MEDICINE

## 2023-12-15 PROCEDURE — 96374 THER/PROPH/DIAG INJ IV PUSH: CPT

## 2023-12-15 RX ORDER — SODIUM CHLORIDE 9 MG/ML
5-250 INJECTION, SOLUTION INTRAVENOUS PRN
Status: DISCONTINUED | OUTPATIENT
Start: 2023-12-15 | End: 2023-12-16 | Stop reason: HOSPADM

## 2023-12-15 RX ORDER — SODIUM CHLORIDE 9 MG/ML
INJECTION, SOLUTION INTRAVENOUS CONTINUOUS
OUTPATIENT
Start: 2023-12-22

## 2023-12-15 RX ORDER — HEPARIN 100 UNIT/ML
500 SYRINGE INTRAVENOUS PRN
OUTPATIENT
Start: 2023-12-22

## 2023-12-15 RX ORDER — DIPHENHYDRAMINE HYDROCHLORIDE 50 MG/ML
50 INJECTION INTRAMUSCULAR; INTRAVENOUS
OUTPATIENT
Start: 2023-12-22

## 2023-12-15 RX ORDER — ALBUTEROL SULFATE 90 UG/1
4 AEROSOL, METERED RESPIRATORY (INHALATION) PRN
OUTPATIENT
Start: 2023-12-22

## 2023-12-15 RX ORDER — ACETAMINOPHEN 325 MG/1
650 TABLET ORAL
OUTPATIENT
Start: 2023-12-22

## 2023-12-15 RX ORDER — EPINEPHRINE 1 MG/ML
0.3 INJECTION, SOLUTION INTRAMUSCULAR; SUBCUTANEOUS PRN
OUTPATIENT
Start: 2023-12-22

## 2023-12-15 RX ORDER — SODIUM CHLORIDE 9 MG/ML
5-250 INJECTION, SOLUTION INTRAVENOUS PRN
OUTPATIENT
Start: 2023-12-22

## 2023-12-15 RX ORDER — SODIUM CHLORIDE 0.9 % (FLUSH) 0.9 %
5-40 SYRINGE (ML) INJECTION PRN
OUTPATIENT
Start: 2023-12-22

## 2023-12-15 RX ORDER — ONDANSETRON 2 MG/ML
8 INJECTION INTRAMUSCULAR; INTRAVENOUS
OUTPATIENT
Start: 2023-12-22

## 2023-12-15 RX ADMIN — IRON SUCROSE 200 MG: 20 INJECTION, SOLUTION INTRAVENOUS at 10:50

## 2023-12-22 ENCOUNTER — HOSPITAL ENCOUNTER (OUTPATIENT)
Facility: HOSPITAL | Age: 53
Setting detail: INFUSION SERIES
Discharge: HOME | End: 2023-12-22
Payer: COMMERCIAL

## 2023-12-22 VITALS
HEART RATE: 70 BPM | TEMPERATURE: 98.7 F | RESPIRATION RATE: 16 BRPM | SYSTOLIC BLOOD PRESSURE: 133 MMHG | DIASTOLIC BLOOD PRESSURE: 82 MMHG

## 2023-12-22 DIAGNOSIS — D63.1 ERYTHROPOIETIN DEFICIENCY ANEMIA: Primary | ICD-10-CM

## 2023-12-22 PROCEDURE — 6360000002 HC RX W HCPCS: Performed by: INTERNAL MEDICINE

## 2023-12-22 PROCEDURE — 2580000003 HC RX 258: Performed by: INTERNAL MEDICINE

## 2023-12-22 PROCEDURE — 96374 THER/PROPH/DIAG INJ IV PUSH: CPT

## 2023-12-22 RX ORDER — SODIUM CHLORIDE 9 MG/ML
INJECTION, SOLUTION INTRAVENOUS CONTINUOUS
OUTPATIENT
Start: 2023-12-29

## 2023-12-22 RX ORDER — SODIUM CHLORIDE 9 MG/ML
5-250 INJECTION, SOLUTION INTRAVENOUS PRN
Status: CANCELLED | OUTPATIENT
Start: 2023-12-29

## 2023-12-22 RX ORDER — DIPHENHYDRAMINE HYDROCHLORIDE 50 MG/ML
50 INJECTION INTRAMUSCULAR; INTRAVENOUS
OUTPATIENT
Start: 2023-12-29

## 2023-12-22 RX ORDER — HEPARIN 100 UNIT/ML
500 SYRINGE INTRAVENOUS PRN
OUTPATIENT
Start: 2023-12-29

## 2023-12-22 RX ORDER — SODIUM CHLORIDE 9 MG/ML
5-250 INJECTION, SOLUTION INTRAVENOUS PRN
OUTPATIENT
Start: 2023-12-29

## 2023-12-22 RX ORDER — SODIUM CHLORIDE 0.9 % (FLUSH) 0.9 %
5-40 SYRINGE (ML) INJECTION PRN
OUTPATIENT
Start: 2023-12-29

## 2023-12-22 RX ORDER — SODIUM CHLORIDE 9 MG/ML
5-250 INJECTION, SOLUTION INTRAVENOUS PRN
Status: DISCONTINUED | OUTPATIENT
Start: 2023-12-22 | End: 2023-12-23 | Stop reason: HOSPADM

## 2023-12-22 RX ORDER — EPINEPHRINE 1 MG/ML
0.3 INJECTION, SOLUTION INTRAMUSCULAR; SUBCUTANEOUS PRN
OUTPATIENT
Start: 2023-12-29

## 2023-12-22 RX ORDER — ALBUTEROL SULFATE 90 UG/1
4 AEROSOL, METERED RESPIRATORY (INHALATION) PRN
OUTPATIENT
Start: 2023-12-29

## 2023-12-22 RX ORDER — ACETAMINOPHEN 325 MG/1
650 TABLET ORAL
OUTPATIENT
Start: 2023-12-29

## 2023-12-22 RX ORDER — ONDANSETRON 2 MG/ML
8 INJECTION INTRAMUSCULAR; INTRAVENOUS
OUTPATIENT
Start: 2023-12-29

## 2023-12-22 RX ADMIN — IRON SUCROSE 200 MG: 20 INJECTION, SOLUTION INTRAVENOUS at 11:02

## 2023-12-22 RX ADMIN — SODIUM CHLORIDE 25 ML/HR: 9 INJECTION, SOLUTION INTRAVENOUS at 11:02

## 2023-12-29 ENCOUNTER — HOSPITAL ENCOUNTER (OUTPATIENT)
Facility: HOSPITAL | Age: 53
Setting detail: INFUSION SERIES
Discharge: HOME | End: 2023-12-29
Payer: COMMERCIAL

## 2023-12-29 VITALS
TEMPERATURE: 98 F | DIASTOLIC BLOOD PRESSURE: 85 MMHG | HEART RATE: 76 BPM | SYSTOLIC BLOOD PRESSURE: 138 MMHG | RESPIRATION RATE: 18 BRPM

## 2023-12-29 DIAGNOSIS — D63.1 ERYTHROPOIETIN DEFICIENCY ANEMIA: Primary | ICD-10-CM

## 2023-12-29 LAB
ALBUMIN SERPL-MCNC: 3.8 G/DL (ref 3.5–5)
ALBUMIN/GLOB SERPL: 1.1 (ref 1.1–2.2)
ALP SERPL-CCNC: 76 U/L (ref 45–117)
ALT SERPL-CCNC: 25 U/L (ref 12–78)
ANION GAP SERPL CALC-SCNC: 3 MMOL/L (ref 5–15)
AST SERPL-CCNC: 16 U/L (ref 15–37)
BILIRUB DIRECT SERPL-MCNC: 0.2 MG/DL (ref 0–0.2)
BILIRUB SERPL-MCNC: 0.6 MG/DL (ref 0.2–1)
BUN SERPL-MCNC: 10 MG/DL (ref 6–20)
BUN/CREAT SERPL: 9 (ref 12–20)
CALCIUM SERPL-MCNC: 8.8 MG/DL (ref 8.5–10.1)
CHLORIDE SERPL-SCNC: 109 MMOL/L (ref 97–108)
CO2 SERPL-SCNC: 28 MMOL/L (ref 21–32)
CREAT SERPL-MCNC: 1.14 MG/DL (ref 0.55–1.02)
GLOBULIN SER CALC-MCNC: 3.5 G/DL (ref 2–4)
GLUCOSE SERPL-MCNC: 65 MG/DL (ref 65–100)
POTASSIUM SERPL-SCNC: 4.2 MMOL/L (ref 3.5–5.1)
PROT SERPL-MCNC: 7.3 G/DL (ref 6.4–8.2)
SODIUM SERPL-SCNC: 140 MMOL/L (ref 136–145)

## 2023-12-29 PROCEDURE — 96374 THER/PROPH/DIAG INJ IV PUSH: CPT

## 2023-12-29 PROCEDURE — 80076 HEPATIC FUNCTION PANEL: CPT

## 2023-12-29 PROCEDURE — 2580000003 HC RX 258: Performed by: INTERNAL MEDICINE

## 2023-12-29 PROCEDURE — 36415 COLL VENOUS BLD VENIPUNCTURE: CPT

## 2023-12-29 PROCEDURE — 80048 BASIC METABOLIC PNL TOTAL CA: CPT

## 2023-12-29 PROCEDURE — 6360000002 HC RX W HCPCS: Performed by: INTERNAL MEDICINE

## 2023-12-29 RX ORDER — EPINEPHRINE 1 MG/ML
0.3 INJECTION, SOLUTION INTRAMUSCULAR; SUBCUTANEOUS PRN
OUTPATIENT
Start: 2024-01-05

## 2023-12-29 RX ORDER — HEPARIN 100 UNIT/ML
500 SYRINGE INTRAVENOUS PRN
OUTPATIENT
Start: 2024-01-05

## 2023-12-29 RX ORDER — SODIUM CHLORIDE 9 MG/ML
5-250 INJECTION, SOLUTION INTRAVENOUS PRN
OUTPATIENT
Start: 2024-01-05

## 2023-12-29 RX ORDER — SODIUM CHLORIDE 9 MG/ML
INJECTION, SOLUTION INTRAVENOUS CONTINUOUS
OUTPATIENT
Start: 2024-01-05

## 2023-12-29 RX ORDER — SODIUM CHLORIDE 0.9 % (FLUSH) 0.9 %
5-40 SYRINGE (ML) INJECTION PRN
OUTPATIENT
Start: 2024-01-05

## 2023-12-29 RX ORDER — ACETAMINOPHEN 325 MG/1
650 TABLET ORAL
OUTPATIENT
Start: 2024-01-05

## 2023-12-29 RX ORDER — SODIUM CHLORIDE 9 MG/ML
5-250 INJECTION, SOLUTION INTRAVENOUS PRN
Status: DISCONTINUED | OUTPATIENT
Start: 2023-12-29 | End: 2023-12-30 | Stop reason: HOSPADM

## 2023-12-29 RX ORDER — DIPHENHYDRAMINE HYDROCHLORIDE 50 MG/ML
50 INJECTION INTRAMUSCULAR; INTRAVENOUS
OUTPATIENT
Start: 2024-01-05

## 2023-12-29 RX ORDER — SODIUM CHLORIDE 9 MG/ML
5-250 INJECTION, SOLUTION INTRAVENOUS PRN
Status: CANCELLED | OUTPATIENT
Start: 2024-01-05

## 2023-12-29 RX ORDER — ONDANSETRON 2 MG/ML
8 INJECTION INTRAMUSCULAR; INTRAVENOUS
OUTPATIENT
Start: 2024-01-05

## 2023-12-29 RX ORDER — ALBUTEROL SULFATE 90 UG/1
4 AEROSOL, METERED RESPIRATORY (INHALATION) PRN
OUTPATIENT
Start: 2024-01-05

## 2023-12-29 RX ADMIN — SODIUM CHLORIDE 25 ML/HR: 9 INJECTION, SOLUTION INTRAVENOUS at 13:45

## 2023-12-29 RX ADMIN — IRON SUCROSE 200 MG: 20 INJECTION, SOLUTION INTRAVENOUS at 13:45

## 2023-12-29 ASSESSMENT — PAIN SCALES - GENERAL: PAINLEVEL_OUTOF10: 0

## 2024-01-05 ENCOUNTER — HOSPITAL ENCOUNTER (OUTPATIENT)
Facility: HOSPITAL | Age: 54
Setting detail: INFUSION SERIES
Discharge: HOME | End: 2024-01-05
Payer: COMMERCIAL

## 2024-01-05 VITALS
DIASTOLIC BLOOD PRESSURE: 77 MMHG | RESPIRATION RATE: 16 BRPM | HEART RATE: 77 BPM | SYSTOLIC BLOOD PRESSURE: 132 MMHG | TEMPERATURE: 98.2 F

## 2024-01-05 DIAGNOSIS — D63.1 ERYTHROPOIETIN DEFICIENCY ANEMIA: Primary | ICD-10-CM

## 2024-01-05 PROCEDURE — 96374 THER/PROPH/DIAG INJ IV PUSH: CPT

## 2024-01-05 PROCEDURE — 6360000002 HC RX W HCPCS: Performed by: INTERNAL MEDICINE

## 2024-01-05 RX ORDER — SODIUM CHLORIDE 9 MG/ML
5-250 INJECTION, SOLUTION INTRAVENOUS PRN
Status: DISCONTINUED | OUTPATIENT
Start: 2024-01-05 | End: 2024-01-06 | Stop reason: HOSPADM

## 2024-01-05 RX ORDER — SODIUM CHLORIDE 9 MG/ML
5-250 INJECTION, SOLUTION INTRAVENOUS PRN
OUTPATIENT
Start: 2024-01-05

## 2024-01-05 RX ORDER — SODIUM CHLORIDE 0.9 % (FLUSH) 0.9 %
5-40 SYRINGE (ML) INJECTION PRN
OUTPATIENT
Start: 2024-01-05

## 2024-01-05 RX ORDER — DIPHENHYDRAMINE HYDROCHLORIDE 50 MG/ML
50 INJECTION INTRAMUSCULAR; INTRAVENOUS
OUTPATIENT
Start: 2024-01-05

## 2024-01-05 RX ORDER — ACETAMINOPHEN 325 MG/1
650 TABLET ORAL
OUTPATIENT
Start: 2024-01-05

## 2024-01-05 RX ORDER — ALBUTEROL SULFATE 90 UG/1
4 AEROSOL, METERED RESPIRATORY (INHALATION) PRN
OUTPATIENT
Start: 2024-01-05

## 2024-01-05 RX ORDER — SODIUM CHLORIDE 9 MG/ML
INJECTION, SOLUTION INTRAVENOUS CONTINUOUS
OUTPATIENT
Start: 2024-01-05

## 2024-01-05 RX ORDER — ONDANSETRON 2 MG/ML
8 INJECTION INTRAMUSCULAR; INTRAVENOUS
OUTPATIENT
Start: 2024-01-05

## 2024-01-05 RX ORDER — EPINEPHRINE 1 MG/ML
0.3 INJECTION, SOLUTION INTRAMUSCULAR; SUBCUTANEOUS PRN
OUTPATIENT
Start: 2024-01-05

## 2024-01-05 RX ORDER — HEPARIN 100 UNIT/ML
500 SYRINGE INTRAVENOUS PRN
OUTPATIENT
Start: 2024-01-05

## 2024-01-05 RX ADMIN — IRON SUCROSE 200 MG: 20 INJECTION, SOLUTION INTRAVENOUS at 13:40

## 2024-01-05 ASSESSMENT — PAIN SCALES - GENERAL: PAINLEVEL_OUTOF10: 0

## 2025-05-05 NOTE — PROGRESS NOTES
[facility] Short Note                       Date: 2024    Name: Js Dowell    MRN: [id number]         : 1970      130 Pt admit to [facility] for Venofer ambulatory in stable condition. Assessment completed. No new concerns voiced. PIV established L Ac pos blood return noted - pt tolerated well      Ms. Keily Dowell's vitals were reviewed prior to and after treatment.   Patient Vitals for the past 12 hrs:   Temp Pulse Resp BP   24 1345 -- -- -- 132/77   24 1330 98.2 °F (36.8 °C) 77 16 134/78         Medications given:   Medications Administered         iron sucrose (VENOFER) injection 200 mg Admin Date  2024 Action  Given Dose  200 mg Route  IntraVENous Administered By  Shey Vernon RN            Ms. Keily Dowell tolerated the infusion, and had no complaints.    [facility]. Keily [facility].     No future appointments.    Shey Vernon RN  2024  1:51 PM   
minutes on the discharge service.